# Patient Record
Sex: FEMALE | Race: WHITE | NOT HISPANIC OR LATINO | Employment: UNEMPLOYED | URBAN - METROPOLITAN AREA
[De-identification: names, ages, dates, MRNs, and addresses within clinical notes are randomized per-mention and may not be internally consistent; named-entity substitution may affect disease eponyms.]

---

## 2021-03-30 ENCOUNTER — OFFICE VISIT (OUTPATIENT)
Dept: FAMILY MEDICINE CLINIC | Facility: CLINIC | Age: 36
End: 2021-03-30
Payer: COMMERCIAL

## 2021-03-30 VITALS
DIASTOLIC BLOOD PRESSURE: 82 MMHG | BODY MASS INDEX: 22.1 KG/M2 | RESPIRATION RATE: 16 BRPM | SYSTOLIC BLOOD PRESSURE: 124 MMHG | WEIGHT: 140.8 LBS | TEMPERATURE: 98.3 F | HEIGHT: 67 IN | HEART RATE: 68 BPM | OXYGEN SATURATION: 100 %

## 2021-03-30 DIAGNOSIS — Z13.6 SCREENING FOR CARDIOVASCULAR CONDITION: ICD-10-CM

## 2021-03-30 DIAGNOSIS — Z00.00 ANNUAL PHYSICAL EXAM: Primary | ICD-10-CM

## 2021-03-30 DIAGNOSIS — R59.9 ADENOPATHY: ICD-10-CM

## 2021-03-30 DIAGNOSIS — R53.83 OTHER FATIGUE: ICD-10-CM

## 2021-03-30 DIAGNOSIS — J35.1 SWELLING OF TONSIL: ICD-10-CM

## 2021-03-30 DIAGNOSIS — Z86.32 HISTORY OF GESTATIONAL DIABETES: ICD-10-CM

## 2021-03-30 DIAGNOSIS — N13.5 UPJ OBSTRUCTION, ACQUIRED: ICD-10-CM

## 2021-03-30 PROCEDURE — 99202 OFFICE O/P NEW SF 15 MIN: CPT | Performed by: NURSE PRACTITIONER

## 2021-03-30 PROCEDURE — 3008F BODY MASS INDEX DOCD: CPT | Performed by: NURSE PRACTITIONER

## 2021-03-30 PROCEDURE — 99385 PREV VISIT NEW AGE 18-39: CPT | Performed by: NURSE PRACTITIONER

## 2021-03-30 PROCEDURE — 3725F SCREEN DEPRESSION PERFORMED: CPT | Performed by: NURSE PRACTITIONER

## 2021-03-30 PROCEDURE — 1036F TOBACCO NON-USER: CPT | Performed by: NURSE PRACTITIONER

## 2021-03-30 NOTE — PROGRESS NOTES
[facility]    NAME: Ferdinand Aldana  AGE: 38 y o  SEX: female  : 1985     DATE: 3/30/2021     Assessment and Plan:     Problem List Items Addressed This Visit     None      Visit Diagnoses     UPJ obstruction, acquired    -  Primary    Relevant Orders    Ambulatory referral to Urology    UA (URINE) with reflex to Scope    Other fatigue        Relevant Orders    CBC and differential    TSH, 3rd generation with Free T4 reflex    Ferritin    Iron    TIBC    Vitamin B12    Vitamin D 25 hydroxy    Lyme Total Antibody Profile with reflex to WB    History of gestational diabetes        Relevant Orders    Hemoglobin A1C    UA (URINE) with reflex to Scope    Screening for cardiovascular condition        Relevant Orders    Comprehensive metabolic panel    Lipid Panel with Direct LDL reflex    Annual physical exam              Immunizations and preventive care screenings were discussed with patient today  Appropriate education was printed on patient's after visit summary  Counseling:  Alcohol/drug use: discussed moderation in alcohol intake, the recommendations for healthy alcohol use, and avoidance of illicit drug use  Dental Health: discussed importance of regular tooth brushing, flossing, and dental visits  Injury prevention: discussed safety/seat belts, safety helmets, smoke detectors, carbon dioxide detectors, and smoking near bedding or upholstery  Sexual health: discussed sexually transmitted diseases, partner selection, use of condoms, avoidance of unintended pregnancy, and contraceptive alternatives  · Exercise: the importance of regular exercise/physical activity was discussed  Recommend exercise 3-5 times per week for at least 30 minutes  BMI Counseling: Body mass index is 22 05 kg/m²   The BMI is above normal  Nutrition recommendations include decreasing portion sizes, encouraging healthy choices of fruits and vegetables, decreasing fast food intake, consuming healthier snacks, limiting drinks that contain sugar, moderation in carbohydrate intake, increasing intake of lean protein, reducing intake of saturated and trans fat and reducing intake of cholesterol  Exercise recommendations include moderate physical activity 150 minutes/week and strength training exercises  Depression Screening and Follow-up Plan: Patient's depression screening was positive with a PHQ-2 score of 0  Clincally patient does not have depression  No treatment is required  No follow-ups on file  Chief Complaint:     Chief Complaint   Patient presents with   [facility]      Patient reports that she has a raised area of skin behind her left tonsil that she first noted about 3 months ago    Blood in stool     Patient reports a singe episode of bright red blood in her stool 3 weeks ago  History of Present Illness:     Adult Annual Physical   Patient here for a comprehensive physical exam  The patient reports problems - fatigue, throat problem  Diet and Physical Activity  · Diet/Nutrition: well balanced diet and consuming 3-5 servings of fruits/vegetables daily  · Exercise: walking and 5-7 times a week on average  Depression Screening  PHQ-9 Depression Screening    PHQ-9:   Frequency of the following problems over the past two weeks:      Little interest or pleasure in doing things: 0 - not at all  Feeling down, depressed, or hopeless: 0 - not at all  PHQ-2 Score: 0       General Health  · Sleep: sleeps well  · Hearing: normal - bilateral   · Vision: most recent eye exam <1 year ago, wears glasses and wears contacts  · Dental: regular dental visits, brushes teeth twice daily and flosses teeth occasionally  /GYN Health  · Last menstrual period: 3/3/2021  · Contraceptive method: celebacy  · History of STDs?: no      Review of Systems:     Review of Systems   Constitutional: Positive for fatigue  HENT: Positive for sore throat  Eyes: Negative  Respiratory: Negative  Cardiovascular: Negative  Gastrointestinal: Negative  Abdominal pain: "pressure from surgery"   Endocrine: Negative  Genitourinary: Negative  Musculoskeletal: Negative  Skin: Negative  Allergic/Immunologic: Negative  Neurological: Negative  Hematological: Negative  Psychiatric/Behavioral: Negative  Past Medical History:     No past medical history on file  Past Surgical History:     No past surgical history on file     Social History:     E-Cigarette/Vaping    E-Cigarette Use Never User      E-Cigarette/Vaping Substances    Nicotine No     THC No     CBD No     Flavoring No     Other No     Unknown No      Social History     Socioeconomic History    Marital status: /Civil Union     Spouse name: None    Number of children: None    Years of education: None    Highest education level: None   Occupational History    None   Social Needs    Financial resource strain: None    Food insecurity     Worry: None     Inability: None    Transportation needs     Medical: None     Non-medical: None   Tobacco Use    Smoking status: Never Smoker    Smokeless tobacco: Never Used    Tobacco comment: Smoked once in her teens   Substance and Sexual Activity    Alcohol use: Not Currently     Frequency: 2-4 times a month     Drinks per session: 1 or 2     Binge frequency: Never    Drug use: Never    Sexual activity: Not Currently   Lifestyle    Physical activity     Days per week: 2 days     Minutes per session: 60 min    Stress: Rather much   Relationships    Social connections     Talks on phone: More than three times a week     Gets together: Once a week     Attends Protestant service: Never     Active member of club or organization: No     Attends meetings of clubs or organizations: Never     Relationship status:     Intimate partner violence     Fear of current or ex partner: No Emotionally abused: No     Physically abused: No     Forced sexual activity: No   Other Topics Concern    None   Social History Narrative    None      Family History:     No family history on file  Current Medications:     No current outpatient medications on file  No current facility-administered medications for this visit  Allergies: Allergies   Allergen Reactions    Ciprofloxacin Headache and Rash     Other reaction(s): headaches  Other reaction(s): headaches  Other reaction(s): headaches  Other reaction(s): headaches  Other reaction(s): headaches  Other reaction(s): headaches    Other reaction(s): HEADACHE   Other reaction(s): headaches  Other reaction(s): headaches        Physical Exam:     /82 (BP Location: Left arm, Patient Position: Sitting, Cuff Size: Standard)   Pulse 68   Temp 98 3 °F (36 8 °C) (Tympanic)   Resp 16   Ht 5' 7" (1 702 m)   Wt 63 9 kg (140 lb 12 8 oz)   LMP 03/03/2021 (Approximate)   SpO2 100%   Breastfeeding No   BMI 22 05 kg/m²     Physical Exam  Vitals signs and nursing note reviewed  Constitutional:       General: She is not in acute distress  Appearance: Normal appearance  She is not ill-appearing, toxic-appearing or diaphoretic  HENT:      Head: Normocephalic and atraumatic  Right Ear: Tympanic membrane, ear canal and external ear normal  There is no impacted cerumen  Left Ear: Tympanic membrane, ear canal and external ear normal  There is no impacted cerumen  Nose: Nose normal  No congestion or rhinorrhea  Mouth/Throat:      Mouth: Mucous membranes are moist       Pharynx: Oropharynx is clear  No oropharyngeal exudate or posterior oropharyngeal erythema  Comments: Right tonsil appears in segments, she fears it could be HPV, without exudate  Eyes:      General:         Right eye: No discharge  Left eye: No discharge  Extraocular Movements: Extraocular movements intact        Conjunctiva/sclera: Conjunctivae normal       Pupils: Pupils are equal, round, and reactive to light  Neck:      Musculoskeletal: Normal range of motion and neck supple  No neck rigidity or muscular tenderness  Vascular: No carotid bruit  Cardiovascular:      Rate and Rhythm: Normal rate and regular rhythm  Pulses: Normal pulses  Heart sounds: Normal heart sounds  No murmur  Pulmonary:      Effort: Pulmonary effort is normal  No respiratory distress  Breath sounds: Normal breath sounds  Abdominal:      General: Abdomen is flat  Bowel sounds are normal       Palpations: Abdomen is soft  Tenderness: There is abdominal tenderness (by surgical site,without signsd of infection)  There is no right CVA tenderness or left CVA tenderness  Musculoskeletal: Normal range of motion  General: No swelling or tenderness  Right lower leg: No edema  Left lower leg: No edema  Lymphadenopathy:      Cervical: Cervical adenopathy present  Skin:     General: Skin is warm and dry  Capillary Refill: Capillary refill takes less than 2 seconds  Findings: No lesion or rash  Neurological:      Mental Status: She is alert and oriented to person, place, and time  Cranial Nerves: No cranial nerve deficit  Gait: Gait normal    Psychiatric:         Mood and Affect: Mood normal          Behavior: Behavior normal          Thought Content:  Thought content normal          Judgment: Judgment normal           Lodema Certain, [address]

## 2021-03-30 NOTE — PATIENT INSTRUCTIONS

## 2021-03-31 ENCOUNTER — TELEPHONE (OUTPATIENT)
Dept: UROLOGY | Facility: AMBULATORY SURGERY CENTER | Age: 36
End: 2021-03-31

## 2021-03-31 NOTE — TELEPHONE ENCOUNTER
Patient being referred to Dr Melissa Bennett for N13 5 (ICD-10-CM) - UPJ obstruction  1st attempt lm to schedule appointment but I see she already sees a different urologist in [place]  Advised if she is seeing a different urologist no need to call back unless she wants to schedule with us

## 2021-04-05 NOTE — TELEPHONE ENCOUNTER
Scheduled patient with Dr Ana Sanchez on 04/16/21  Patient would like to know how many surgery does Dr Ana Sanchez do in a year for this  Patient advised that this was not for a kidney stone it was for a blood vessel on her ureter  Patient just would like to know if Dr Ana Sanchez would be the perfect fit for her  Please advise       Complaint/diagnosis: UPJ obstruction    Insurance:[employer] O    History of Cancer:no    Previous Urologist:Yes Dr Cecilia Raman and Dr Robina Cook    Outside testing/where:no      Records requested/where:no in care everywhere    Preferred Location:[facility]

## 2021-04-06 NOTE — TELEPHONE ENCOUNTER
SULEMA Feliciano to check on website to look up Dr Tripp Herzog - He is the only doctor at our [facility] location    On the website all our other urologists profiles are listed and she can check them out but the best way is to meet him and she is scheduled for an appointment on 4/16 @ 1:45

## 2021-04-16 ENCOUNTER — CONSULT (OUTPATIENT)
Dept: UROLOGY | Facility: HOSPITAL | Age: 36
End: 2021-04-16
Payer: COMMERCIAL

## 2021-04-16 VITALS — WEIGHT: 140 LBS | BODY MASS INDEX: 21.97 KG/M2 | HEIGHT: 67 IN

## 2021-04-16 DIAGNOSIS — N13.5 UPJ OBSTRUCTION, ACQUIRED: ICD-10-CM

## 2021-04-16 LAB — POST-VOID RESIDUAL VOLUME, ML POC: 18 ML

## 2021-04-16 PROCEDURE — 99204 OFFICE O/P NEW MOD 45 MIN: CPT | Performed by: UROLOGY

## 2021-04-16 PROCEDURE — 3008F BODY MASS INDEX DOCD: CPT | Performed by: UROLOGY

## 2021-04-16 PROCEDURE — 51798 US URINE CAPACITY MEASURE: CPT | Performed by: UROLOGY

## 2021-04-16 RX ORDER — IPRATROPIUM BROMIDE 21 UG/1
SPRAY, METERED NASAL
COMMUNITY
Start: 2021-04-02

## 2021-04-16 NOTE — PROGRESS NOTES
HISTORY:    Third opinion regarding persistent abdominal pain after pyeloplasty performed in fall 2020  See extensive records from elsewhere  Right UPJ obstruction with scan showing diminished perfusion and delayed excretion  Surgery done in [place]  Preop symptoms were epigastric pain, right lower quadrant pain and tenderness, and moderate right CVA pain    Postop, her CVA pain is largely resolved, but she has persistent and she feels severe episodic epigastric pain, and worsening right lower quadrant pain and tenderness to palpation  Had a second opinion in [place], and is here for a third opinion  She also has a sensation of poor bladder emptying, double voiding  No burning urgency frequency or infections  ASSESSMENT / PLAN:    Nuclear medicine renal scan done last month showed no obstruction, good perfusion  There was seen to be Slight calyceal dilation, but that is not unusual after successful pyeloplasty    I do not know why she would have  epigastric pain and right lower  quadrant tenderness, I suspect that is not related to her prior UPJ obstruction, and not postsurgical as well    She has GI evaluation upcoming    She is quite bothered by the persistent pain, but I have told her that the nuclear medicine scan is very reassuring that her kidney has been fixed and she has no more obstruction  The following portions of the patient's history were reviewed and updated as appropriate: allergies, current medications, past family history, past medical history, past social history, past surgical history and problem list     Review of Systems   All other systems reviewed and are negative  Objective:     Physical Exam  Constitutional:       General: She is not in acute distress  Appearance: She is well-developed  She is not diaphoretic  HENT:      Head: Normocephalic and atraumatic  Eyes:      General: No scleral icterus    Pulmonary:      Effort: Pulmonary effort is normal    Abdominal:      Comments: Mild tenderness to palpation epigastric and right lower quadrant   Skin:     Coloration: Skin is not pale  Neurological:      Mental Status: She is alert and oriented to person, place, and time  Psychiatric:         Behavior: Behavior normal          Thought Content: Thought content normal          Judgment: Judgment normal            No results found for: PSA]  No results found for: BUN  No results found for: CREATININE  No components found for: CBC      There is no problem list on file for this patient  Diagnoses and all orders for this visit:    UPJ obstruction, acquired  -     Ambulatory referral to Urology  -     POCT Measure PVR    Other orders  -     ipratropium (ATROVENT) 0 03 % nasal spray           Patient ID: Benjamin Oswald is a 38 y o  female  Current Outpatient Medications:     ipratropium (ATROVENT) 0 03 % nasal spray, , Disp: , Rfl:     No past medical history on file  No past surgical history on file      Social History

## 2021-04-20 ENCOUNTER — IMMUNIZATIONS (OUTPATIENT)
Dept: FAMILY MEDICINE CLINIC | Facility: HOSPITAL | Age: 36
End: 2021-04-20

## 2021-04-20 DIAGNOSIS — Z23 ENCOUNTER FOR IMMUNIZATION: Primary | ICD-10-CM

## 2021-04-20 PROCEDURE — 91300 SARS-COV-2 / COVID-19 MRNA VACCINE (PFIZER-BIONTECH) 30 MCG: CPT

## 2021-04-20 PROCEDURE — 0001A SARS-COV-2 / COVID-19 MRNA VACCINE (PFIZER-BIONTECH) 30 MCG: CPT

## 2021-05-13 ENCOUNTER — OFFICE VISIT (OUTPATIENT)
Dept: GASTROENTEROLOGY | Facility: CLINIC | Age: 36
End: 2021-05-13
Payer: COMMERCIAL

## 2021-05-13 VITALS — DIASTOLIC BLOOD PRESSURE: 80 MMHG | BODY MASS INDEX: 22.4 KG/M2 | SYSTOLIC BLOOD PRESSURE: 140 MMHG | WEIGHT: 143 LBS

## 2021-05-13 DIAGNOSIS — Z12.11 SCREENING FOR COLON CANCER: ICD-10-CM

## 2021-05-13 DIAGNOSIS — R10.13 EPIGASTRIC PAIN: Primary | ICD-10-CM

## 2021-05-13 DIAGNOSIS — R10.31 RIGHT LOWER QUADRANT ABDOMINAL PAIN: ICD-10-CM

## 2021-05-13 PROCEDURE — 99203 OFFICE O/P NEW LOW 30 MIN: CPT | Performed by: INTERNAL MEDICINE

## 2021-05-13 PROCEDURE — 1036F TOBACCO NON-USER: CPT | Performed by: INTERNAL MEDICINE

## 2021-05-13 NOTE — PROGRESS NOTES
[facility] - Outpatient Consultation  Tatum Morocho 38 y o  female MRN: [id number]  Encounter: [id number]    ASSESSMENT AND PLAN:      1  Epigastric pain  Epigastric pain it is really most suggestive of abdominal wall a muscular pain  It increases with tensing of the abdominal muscles and performing a Valsalva maneuver  There is no relation to p o  intake and the patient is in fact eating fine without any symptoms  Advise gentle stretching and also consideration of consultation with physical therapy or Sports Medicine  · Diet as tolerated  · Observe for any GI symptoms related to her upper abdominal wall pain  · Consider consultation with physical therapy or Sports Medicine    2  Right lower quadrant abdominal pain  Again the right lower quadrant pain too is not related to GI function  There is no exacerbation with eating or not eating  There is no relationship to having stools or not  There is no relief with defecation  She is having normal bowel movements regardless the presence or absence of pain  This 2 might be muscular related to the abdominal wall strain  It is also in the same side that she has been diagnosed with hydronephrosis and has had a pyloroplasty  There may be some underlying adhesions or referred pain if there is any pressure in the urinary tract  Again in the absence of GI symptoms I do not think endoscopic evaluation is indicated at this time but I have advised the patient to pay attention to any associated GI symptoms  3  Screening for colon cancer  Average risk  Recommend screening colonoscopy at age 36-53  Followup Appointment:   About 3 months or sooner if needed  ______________________________________________________________________    Chief Complaint   Patient presents with    Abdominal Pain    Bloated    Rectal Bleeding       HPI:   Tatum Morocho is a 38y o  year old female who presents with abdominal pain  Abdominal pain for a year  Epigastric pain worse with lifting and increased activity  Tightness  Also with RLQ tenderness  Last year found to have bilateral hydronephrosis, wound up getting pyeloplasty  No food triggers, appetite and weight normal   No postprandial discomfort  No relationship to stool  Stools normal, daily formed stools, no diarrhea or straining  Last March had small streak of blood on stool once  Reports that scans look like things are working as expected, but still with some hydro on the right  No medications or OTC Rx/supplements       Historical Information   Past Medical History:   Diagnosis Date    Hydronephrosis      Past Surgical History:   Procedure Laterality Date    COLONOSCOPY      October 2013:  Anal fissure and hemorrhoids, otherwise normal colonoscopy including biopsy negative for microscopic colitis    PYELOPLASTY      robotic     Social History     Substance and Sexual Activity   Alcohol Use Not Currently    Frequency: 2-4 times a month    Drinks per session: 1 or 2    Binge frequency: Never     Social History     Substance and Sexual Activity   Drug Use Never     Social History     Tobacco Use   Smoking Status Never Smoker   Smokeless Tobacco Never Used   Tobacco Comment    Smoked once in her teens     Family History   Problem Relation Age of Onset    Cancer Father     Kidney cancer Father     Colon polyps Neg Hx     Colon cancer Neg Hx     Inflammatory bowel disease Neg Hx     Celiac disease Neg Hx        Meds/Allergies     Current Outpatient Medications:     ipratropium (ATROVENT) 0 03 % nasal spray    Allergies   Allergen Reactions    Ciprofloxacin Headache and Rash     Other reaction(s): headaches  Other reaction(s): headaches  Other reaction(s): headaches  Other reaction(s): headaches  Other reaction(s): headaches  Other reaction(s): headaches    Other reaction(s): HEADACHE   Other reaction(s): headaches  Other reaction(s): headaches         PHYSICAL EXAM:    Blood pressure 140/80, weight 64 9 kg (143 lb), not currently breastfeeding  Body mass index is 22 4 kg/m²  General Appearance: NAD, cooperative, alert  Eyes: Anicteric, PERRLA, EOMI  ENT:  Normocephalic, atraumatic, normal mucosa  Neck:  Supple, symmetrical, trachea midline,   Resp:  Clear to auscultation bilaterally; no rales, rhonchi or wheezing; respirations unlabored   CV:  S1 S2, Regular rate and rhythm; no murmur, rub, or gallop  GI:  Soft,   Mild epigastric tenderness when tensing abdominal musculature is with Valsalva maneuver, non-distended; normal bowel sounds; no masses, no organomegaly   Rectal: Deferred  Musculoskeletal: No cyanosis, clubbing or edema  Normal ROM  Skin:  No jaundice, rashes, or lesions   Heme/Lymph: No palpable cervical lymphadenopathy  Psych: Normal affect, good eye contact  Neuro: No gross deficits, AAOx3    Lab Results:   No results found for: WBC, HGB, HCT, MCV, PLT  No results found for: NA, K, CL, CO2, ANIONGAP, BUN, CREATININE, GLUCOSE, GLUF, CALCIUM, CORRECTEDCA, AST, ALT, ALKPHOS, PROT, BILITOT, EGFR  No results found for: IRON, TIBC, FERRITIN  No results found for: LIPASE    Radiology Results:   No results found  REVIEW OF SYSTEMS:    CONSTITUTIONAL: Denies any fever, chills, rigors, and weight loss  HEENT: No earache or tinnitus  Denies hearing loss or visual disturbances  CARDIOVASCULAR: No chest pain or palpitations  RESPIRATORY: Denies any cough, hemoptysis, shortness of breath or dyspnea on exertion  GASTROINTESTINAL: As noted in the History of Present Illness  GENITOURINARY: No problems with urination  Denies any hematuria or dysuria  NEUROLOGIC: No dizziness or vertigo, denies headaches  MUSCULOSKELETAL: Denies any muscle or joint pain  SKIN: Denies skin rashes or itching  ENDOCRINE: Denies excessive thirst  Denies intolerance to heat or cold  PSYCHOSOCIAL: Denies depression or anxiety  Denies any recent memory loss

## 2021-05-13 NOTE — PATIENT INSTRUCTIONS
Diet as tolerated, encourage plenty of dietary fiber and fluids  Observe for any relationship of GI function, I e  eating or bowel movements, with upper abdominal pain or right lower quadrant pain  Consider consultation with sports medicine her physical therapy to assess abdominal wall pathology

## 2021-05-13 NOTE — LETTER
May 13, 2021     [address] [employer]    Patient: Fe Gonzalez   YOB: 1985   Date of Visit: 5/13/2021       Dear Dr Sosa Mt:    Thank you for referring Fe Gonzalez to me for evaluation  Below are my notes for this consultation  If you have questions, please do not hesitate to call me  I look forward to following your patient along with you  Sincerely,        Heena Villalobos MD        CC: No Recipients  Heena Villalobos MD  5/13/2021  5:54 PM  Sign when Signing Visit    [address] [facility] - Outpatient Consultation  Fe Gonzalez 38 y o  female MRN: [id number]  Encounter: [id number]    ASSESSMENT AND PLAN:      1  Epigastric pain  Epigastric pain it is really most suggestive of abdominal wall a muscular pain  It increases with tensing of the abdominal muscles and performing a Valsalva maneuver  There is no relation to p o  intake and the patient is in fact eating fine without any symptoms  Advise gentle stretching and also consideration of consultation with physical therapy or Sports Medicine  · Diet as tolerated  · Observe for any GI symptoms related to her upper abdominal wall pain  · Consider consultation with physical therapy or Sports Medicine    2  Right lower quadrant abdominal pain  Again the right lower quadrant pain too is not related to GI function  There is no exacerbation with eating or not eating  There is no relationship to having stools or not  There is no relief with defecation  She is having normal bowel movements regardless the presence or absence of pain  This 2 might be muscular related to the abdominal wall strain  It is also in the same side that she has been diagnosed with hydronephrosis and has had a pyloroplasty  There may be some underlying adhesions or referred pain if there is any pressure in the urinary tract    Again in the absence of GI symptoms I do not think endoscopic evaluation is indicated at this time but I have advised the patient to pay attention to any associated GI symptoms  3  Screening for colon cancer  Average risk  Recommend screening colonoscopy at age 36-53  Followup Appointment:   About 3 months or sooner if needed  ______________________________________________________________________    Chief Complaint   Patient presents with    Abdominal Pain    Bloated    Rectal Bleeding       HPI:   Agustina Moe is a 38y o  year old female who presents with abdominal pain  Abdominal pain for a year  Epigastric pain worse with lifting and increased activity  Tightness  Also with RLQ tenderness  Last year found to have bilateral hydronephrosis, wound up getting pyeloplasty  No food triggers, appetite and weight normal   No postprandial discomfort  No relationship to stool  Stools normal, daily formed stools, no diarrhea or straining  Last March had small streak of blood on stool once  Reports that scans look like things are working as expected, but still with some hydro on the right  No medications or OTC Rx/supplements       Historical Information   Past Medical History:   Diagnosis Date    Hydronephrosis      Past Surgical History:   Procedure Laterality Date    COLONOSCOPY      October 2013:  Anal fissure and hemorrhoids, otherwise normal colonoscopy including biopsy negative for microscopic colitis    PYELOPLASTY      robotic     Social History     Substance and Sexual Activity   Alcohol Use Not Currently    Frequency: 2-4 times a month    Drinks per session: 1 or 2    Binge frequency: Never     Social History     Substance and Sexual Activity   Drug Use Never     Social History     Tobacco Use   Smoking Status Never Smoker   Smokeless Tobacco Never Used   Tobacco Comment    Smoked once in her teens     Family History   Problem Relation Age of Onset    Cancer Father     Kidney cancer Father     Colon polyps Neg Hx     Colon cancer Neg Hx     Inflammatory bowel disease Neg Hx     Celiac disease Neg Hx        Meds/Allergies     Current Outpatient Medications:     ipratropium (ATROVENT) 0 03 % nasal spray    Allergies   Allergen Reactions    Ciprofloxacin Headache and Rash     Other reaction(s): headaches  Other reaction(s): headaches  Other reaction(s): headaches  Other reaction(s): headaches  Other reaction(s): headaches  Other reaction(s): headaches    Other reaction(s): HEADACHE   Other reaction(s): headaches  Other reaction(s): headaches         PHYSICAL EXAM:    Blood pressure 140/80, weight 64 9 kg (143 lb), not currently breastfeeding  Body mass index is 22 4 kg/m²  General Appearance: NAD, cooperative, alert  Eyes: Anicteric, PERRLA, EOMI  ENT:  Normocephalic, atraumatic, normal mucosa  Neck:  Supple, symmetrical, trachea midline,   Resp:  Clear to auscultation bilaterally; no rales, rhonchi or wheezing; respirations unlabored   CV:  S1 S2, Regular rate and rhythm; no murmur, rub, or gallop  GI:  Soft,   Mild epigastric tenderness when tensing abdominal musculature is with Valsalva maneuver, non-distended; normal bowel sounds; no masses, no organomegaly   Rectal: Deferred  Musculoskeletal: No cyanosis, clubbing or edema  Normal ROM  Skin:  No jaundice, rashes, or lesions   Heme/Lymph: No palpable cervical lymphadenopathy  Psych: Normal affect, good eye contact  Neuro: No gross deficits, AAOx3    Lab Results:   No results found for: WBC, HGB, HCT, MCV, PLT  No results found for: NA, K, CL, CO2, ANIONGAP, BUN, CREATININE, GLUCOSE, GLUF, CALCIUM, CORRECTEDCA, AST, ALT, ALKPHOS, PROT, BILITOT, EGFR  No results found for: IRON, TIBC, FERRITIN  No results found for: LIPASE    Radiology Results:   No results found  REVIEW OF SYSTEMS:    CONSTITUTIONAL: Denies any fever, chills, rigors, and weight loss  HEENT: No earache or tinnitus  Denies hearing loss or visual disturbances    CARDIOVASCULAR: No chest pain or palpitations  RESPIRATORY: Denies any cough, hemoptysis, shortness of breath or dyspnea on exertion  GASTROINTESTINAL: As noted in the History of Present Illness  GENITOURINARY: No problems with urination  Denies any hematuria or dysuria  NEUROLOGIC: No dizziness or vertigo, denies headaches  MUSCULOSKELETAL: Denies any muscle or joint pain  SKIN: Denies skin rashes or itching  ENDOCRINE: Denies excessive thirst  Denies intolerance to heat or cold  PSYCHOSOCIAL: Denies depression or anxiety  Denies any recent memory loss

## 2021-05-15 ENCOUNTER — IMMUNIZATIONS (OUTPATIENT)
Dept: FAMILY MEDICINE CLINIC | Facility: HOSPITAL | Age: 36
End: 2021-05-15

## 2021-05-15 DIAGNOSIS — Z23 ENCOUNTER FOR IMMUNIZATION: Primary | ICD-10-CM

## 2021-05-15 PROCEDURE — 0002A SARS-COV-2 / COVID-19 MRNA VACCINE (PFIZER-BIONTECH) 30 MCG: CPT

## 2021-05-15 PROCEDURE — 91300 SARS-COV-2 / COVID-19 MRNA VACCINE (PFIZER-BIONTECH) 30 MCG: CPT

## 2024-01-18 ENCOUNTER — APPOINTMENT (OUTPATIENT)
Dept: LAB | Facility: CLINIC | Age: 39
End: 2024-01-18

## 2024-01-18 DIAGNOSIS — R00.2 PALPITATIONS: ICD-10-CM

## 2024-01-18 DIAGNOSIS — F41.0 PANIC DISORDER WITHOUT AGORAPHOBIA: ICD-10-CM

## 2024-09-20 ENCOUNTER — APPOINTMENT (OUTPATIENT)
Dept: LAB | Facility: CLINIC | Age: 39
End: 2024-09-20
Payer: COMMERCIAL

## 2024-09-20 DIAGNOSIS — R42 DIZZINESS AND GIDDINESS: ICD-10-CM

## 2024-09-20 DIAGNOSIS — R00.2 PALPITATIONS: ICD-10-CM

## 2024-09-20 LAB
ALBUMIN SERPL BCG-MCNC: 4.2 G/DL (ref 3.5–5)
ALP SERPL-CCNC: 65 U/L (ref 34–104)
ALT SERPL W P-5'-P-CCNC: 23 U/L (ref 7–52)
ANION GAP SERPL CALCULATED.3IONS-SCNC: 5 MMOL/L (ref 4–13)
AST SERPL W P-5'-P-CCNC: 22 U/L (ref 13–39)
BASOPHILS # BLD AUTO: 0.05 THOUSANDS/ΜL (ref 0–0.1)
BASOPHILS NFR BLD AUTO: 1 % (ref 0–1)
BILIRUB SERPL-MCNC: 0.59 MG/DL (ref 0.2–1)
BUN SERPL-MCNC: 14 MG/DL (ref 5–25)
CALCIUM SERPL-MCNC: 9 MG/DL (ref 8.4–10.2)
CHLORIDE SERPL-SCNC: 103 MMOL/L (ref 96–108)
CO2 SERPL-SCNC: 28 MMOL/L (ref 21–32)
CREAT SERPL-MCNC: 0.74 MG/DL (ref 0.6–1.3)
EOSINOPHIL # BLD AUTO: 0.17 THOUSAND/ΜL (ref 0–0.61)
EOSINOPHIL NFR BLD AUTO: 2 % (ref 0–6)
ERYTHROCYTE [DISTWIDTH] IN BLOOD BY AUTOMATED COUNT: 14.1 % (ref 11.6–15.1)
EST. AVERAGE GLUCOSE BLD GHB EST-MCNC: 134 MG/DL
FERRITIN SERPL-MCNC: 4 NG/ML (ref 11–307)
GFR SERPL CREATININE-BSD FRML MDRD: 102 ML/MIN/1.73SQ M
GLUCOSE SERPL-MCNC: 176 MG/DL (ref 65–140)
HBA1C MFR BLD: 6.3 %
HCT VFR BLD AUTO: 36.5 % (ref 34.8–46.1)
HGB BLD-MCNC: 11.7 G/DL (ref 11.5–15.4)
IMM GRANULOCYTES # BLD AUTO: 0.02 THOUSAND/UL (ref 0–0.2)
IMM GRANULOCYTES NFR BLD AUTO: 0 % (ref 0–2)
IRON SATN MFR SERPL: 18 % (ref 15–50)
IRON SERPL-MCNC: 72 UG/DL (ref 50–212)
LYMPHOCYTES # BLD AUTO: 1.65 THOUSANDS/ΜL (ref 0.6–4.47)
LYMPHOCYTES NFR BLD AUTO: 19 % (ref 14–44)
MCH RBC QN AUTO: 27.1 PG (ref 26.8–34.3)
MCHC RBC AUTO-ENTMCNC: 32.1 G/DL (ref 31.4–37.4)
MCV RBC AUTO: 85 FL (ref 82–98)
MONOCYTES # BLD AUTO: 0.58 THOUSAND/ΜL (ref 0.17–1.22)
MONOCYTES NFR BLD AUTO: 7 % (ref 4–12)
NEUTROPHILS # BLD AUTO: 6.47 THOUSANDS/ΜL (ref 1.85–7.62)
NEUTS SEG NFR BLD AUTO: 71 % (ref 43–75)
NRBC BLD AUTO-RTO: 0 /100 WBCS
PLATELET # BLD AUTO: 168 THOUSANDS/UL (ref 149–390)
POTASSIUM SERPL-SCNC: 4.1 MMOL/L (ref 3.5–5.3)
PROT SERPL-MCNC: 6.4 G/DL (ref 6.4–8.4)
RBC # BLD AUTO: 4.31 MILLION/UL (ref 3.81–5.12)
SODIUM SERPL-SCNC: 136 MMOL/L (ref 135–147)
TIBC SERPL-MCNC: 408 UG/DL (ref 250–450)
UIBC SERPL-MCNC: 336 UG/DL (ref 155–355)
WBC # BLD AUTO: 8.94 THOUSAND/UL (ref 4.31–10.16)

## 2024-09-20 PROCEDURE — 83550 IRON BINDING TEST: CPT

## 2024-09-20 PROCEDURE — 83540 ASSAY OF IRON: CPT

## 2024-09-20 PROCEDURE — 82728 ASSAY OF FERRITIN: CPT

## 2024-09-20 PROCEDURE — 36415 COLL VENOUS BLD VENIPUNCTURE: CPT

## 2024-09-20 PROCEDURE — 80053 COMPREHEN METABOLIC PANEL: CPT

## 2024-09-20 PROCEDURE — 85025 COMPLETE CBC W/AUTO DIFF WBC: CPT

## 2024-09-20 PROCEDURE — 83036 HEMOGLOBIN GLYCOSYLATED A1C: CPT

## 2024-09-27 LAB
ATRIAL RATE: 63 BPM
P AXIS: 52 DEGREES
PR INTERVAL: 162 MS
QRS AXIS: 84 DEGREES
QRSD INTERVAL: 100 MS
QT INTERVAL: 434 MS
QTC INTERVAL: 444 MS
T WAVE AXIS: 41 DEGREES
VENTRICULAR RATE: 63 BPM

## 2024-09-27 PROCEDURE — 93010 ELECTROCARDIOGRAM REPORT: CPT | Performed by: INTERNAL MEDICINE

## 2024-11-11 ENCOUNTER — OFFICE VISIT (OUTPATIENT)
Dept: ENDOCRINOLOGY | Facility: CLINIC | Age: 39
End: 2024-11-11
Payer: COMMERCIAL

## 2024-11-11 VITALS
DIASTOLIC BLOOD PRESSURE: 84 MMHG | HEIGHT: 67 IN | WEIGHT: 137.8 LBS | SYSTOLIC BLOOD PRESSURE: 128 MMHG | BODY MASS INDEX: 21.63 KG/M2

## 2024-11-11 DIAGNOSIS — R73.03 PREDIABETES: Primary | ICD-10-CM

## 2024-11-11 DIAGNOSIS — E55.9 VITAMIN D DEFICIENCY: ICD-10-CM

## 2024-11-11 DIAGNOSIS — E61.1 IRON DEFICIENCY: ICD-10-CM

## 2024-11-11 PROCEDURE — 99204 OFFICE O/P NEW MOD 45 MIN: CPT

## 2024-11-11 RX ORDER — ERGOCALCIFEROL 1.25 MG/1
CAPSULE, LIQUID FILLED ORAL
COMMUNITY
Start: 2024-06-10

## 2024-11-11 NOTE — PROGRESS NOTES
Name: Jodie Nicole   : 1985 MRN: [id number]  Encounter: [id number]      Assessment and Plan:  Assessment & Plan  Prediabetes  Lab Results   Component Value Date    HGBA1C 6.3 (H) 2024   Previously noted to have A1C of 5.9% in 2024.   Denies symptoms of hyperglycemia.   She has a history of gestational diabetes in 5480-5645 managed with diet.   Given iron deficiency noted on labs, with possible increased RBC turnover, question accuracy of HbA1c.  Will order fructosamine which is not dependent on iron/ferritin/hemoglobin levels.  Additionally, will order C-peptide and fasting glucose, GIULIANO 65, insulin autoantibody, IA 2 and zinc transporter 8 antibody for additional evaluation.    Plan:   Will order fructosamine level, fasting C-peptide and glucose.  Will check GAD65, Insulin autoantibody, IA-2 and Zn transporter 8.   Further recommendations pending results of above.  Rest of plan as below.  Orders:    Fructosamine; Future    GAD65, IA-2, and Insulin Autoantibody; Future    Zinc Transporter 8 (Znt8) Antibody; Future    C-peptide; Future    Glucose, fasting; Future    Fructosamine    Zinc Transporter 8 (Znt8) Antibody    C-peptide    Glucose, fasting    Vitamin D deficiency  Reports history of vitamin D deficiency   Was previoulsy taking 60953 units weekly in the spring, stopped taking over the summer and was noted to have 25-OH-Vit D of 14 per patient.   Currently takes ergocalciferol 58069 units/week prescribed by PCP  No 25-OH-vit D on file.  Given low vit D and iron noted on labs, question malabsorption  Will check a celiac panel  Will get updates labs.  Orders:    Vitamin D 25 hydroxy; Future    Vitamin D 25 hydroxy    Iron deficiency  Noted to have low-normal iron levels, high-normal TIBC/UIBC.  Has Ferritin of 4  Most recent Hb in 2024 - 11.7g/dL  Question possible malabsorption contributing to low iron and vitamin D levels.  Advised patient to start taking an over the  counter iron supplement  Will order a celiac panel  Advised patient to follow up with PCP regarding low ferritin and low iron.   Orders:    Celiac Disease Panel; Future           Subjective:     Patient ID: Jodie Nicole is a 38 y.o. female with a past medical history of gestational diabetes mellitus, congenital hydronephrosis who is seen today in consult for the management of hyperglycemia.  She reports being diagnosed with gestational diabetes in 4743-8910 when pregnant with her daughter.  Reports resolution after birth, however denies having glucose tolerance testing ordered after delivery.   States that her A1c has been normal until March 2024 when it was noted to be 5.9%.  Most recent A1c on file in September 20 24-6.3%, which is why PCP recommended evaluation by endocrinology.  At this time she does not take anti-diabetes medications.  Does not own a glucometer and does not check her sugars at home.  Breakfast usually consist of coffee with cream, cup of cottage cheese with banana and pecan/almonds on Gurvinder toast.  For lunch she usually has egg whites with butter and cheese on Gurvinder toast, sometimes avocado.  Dinner usually is very consists of vegetables, protein, tacos, cheeseburgers, soup with barley, cheese.  She snacks at 3:30 PM-usually has yogurt or skyr with pecans or almonds, sometimes has a protein smoothie, guacamole and chips.  She walks 2 miles 3 times a week, does light yoga as well as body weight lunges and squats at home.  Endorses 10 pound weight gain in the past year which she attributes to feeling less anxious after being started on Zoloft.  She does take vitamin D 50,000 units weekly prescribed by her primary care provider as vitamin D levels in September were noted to be 14.  She reports that she was taking vitamin D 50,000 units weekly in the spring, however stopped it over the summer.  Endorses occasional dizziness that she describes as the room shifting, also occasional  lightheadedness, particularly if not eating or bending down to pick something up.  States that she used to pass out a lot as a child.  Denies increased thirst or urination, heat or cold intolerance, diarrhea or constipation, palpitations, tremors.        Patient has no other complaints at this time.    Review of Systems   Constitutional:  Negative for activity change, appetite change, fatigue, fever and unexpected weight change.   HENT:  Negative for congestion, trouble swallowing and voice change.    Eyes:  Negative for visual disturbance.   Respiratory:  Negative for cough and shortness of breath.    Cardiovascular:  Negative for chest pain, palpitations and leg swelling.   Gastrointestinal:  Negative for abdominal pain, constipation, diarrhea, nausea and vomiting.   Endocrine: Negative for polydipsia and polyuria.   Genitourinary:  Negative for frequency.   Musculoskeletal:  Negative for myalgias.   Skin:  Negative for rash.   Neurological:  Negative for dizziness, tremors, facial asymmetry, weakness, light-headedness, numbness and headaches.   Psychiatric/Behavioral:  Negative for dysphoric mood and sleep disturbance. The patient is not nervous/anxious.    All other systems reviewed and are negative.       There is no problem list on file for this patient.       Current Outpatient Medications   Medication Sig Dispense Refill    ergocalciferol (VITAMIN D2) 50,000 units TAKE 1 CAPSULE BY MOUTH ONE TIME PER WEEK      sertraline (ZOLOFT) 50 mg tablet 75 mg      ipratropium (ATROVENT) 0.03 % nasal spray       Multiple Vitamin (multivitamin) tablet Take 1 tablet by mouth daily (Patient not taking: Reported on 11/11/2024)       No current facility-administered medications for this visit.        Allergies   Allergen Reactions    Ciprofloxacin Headache and Rash     Other reaction(s): headaches  Other reaction(s): headaches  Other reaction(s): headaches  Other reaction(s): headaches  Other reaction(s): headaches  Other  "reaction(s): headaches    Other reaction(s): HEADACHE   Other reaction(s): headaches  Other reaction(s): headaches          The following portions of the patient's history were reviewed and updated as appropriate: allergies, current medications, past family history, past medical history, past social history, past surgical history and problem list.          Objective:    /84 (BP Location: Left arm, Patient Position: Sitting, Cuff Size: Adult)   Ht 5' 7\" (1.702 m)   Wt 62.5 kg (137 lb 12.8 oz)   BMI 21.58 kg/m²      Body mass index is 21.58 kg/m².     Physical Exam  Vitals and nursing note reviewed.   Constitutional:       General: She is not in acute distress.     Appearance: Normal appearance. She is not ill-appearing, toxic-appearing or diaphoretic.   HENT:      Head: Normocephalic and atraumatic.      Nose: Nose normal.      Mouth/Throat:      Mouth: Mucous membranes are moist.      Pharynx: Oropharynx is clear.   Eyes:      General: No scleral icterus.        Right eye: No discharge.         Left eye: No discharge.      Extraocular Movements: Extraocular movements intact.      Conjunctiva/sclera: Conjunctivae normal.   Neck:      Comments: Thyroid gland not enlarged, no palpable nodules.  Cardiovascular:      Rate and Rhythm: Normal rate and regular rhythm.      Pulses: Normal pulses.      Heart sounds: Normal heart sounds. No murmur heard.  Pulmonary:      Effort: Pulmonary effort is normal. No respiratory distress.      Breath sounds: Normal breath sounds. No wheezing, rhonchi or rales.   Abdominal:      General: Abdomen is flat. There is no distension.      Palpations: Abdomen is soft.   Musculoskeletal:         General: Normal range of motion.      Cervical back: Normal range of motion and neck supple.      Right lower leg: No edema.      Left lower leg: No edema.   Skin:     General: Skin is warm and dry.      Coloration: Skin is not jaundiced or pale.   Neurological:      Mental Status: She is " alert and oriented to person, place, and time. Mental status is at baseline.   Psychiatric:         Mood and Affect: Mood normal.         Behavior: Behavior normal.         Thought Content: Thought content normal.         Judgment: Judgment normal.       Nutrition Assessment and Intervention:     Reviewed food recall journal    Recommended completion of food recall journal      Physical Activity Assessment and Intervention:    Activity journal reviewed

## 2024-11-11 NOTE — PATIENT INSTRUCTIONS
Please get fasting bloodwork done at your earliest convenience.   Start taking over the counter iron supplement (ferrous sulfate) 325mg daily and follow up with your primary care provider for iron deficiency.   We will reach out once we have your results.   Next scheduled follow up pending lab results.

## 2024-11-12 ENCOUNTER — APPOINTMENT (OUTPATIENT)
Dept: LAB | Facility: CLINIC | Age: 39
End: 2024-11-12
Payer: COMMERCIAL

## 2024-11-12 DIAGNOSIS — E61.1 IRON DEFICIENCY: ICD-10-CM

## 2024-11-12 DIAGNOSIS — E55.9 VITAMIN D DEFICIENCY: ICD-10-CM

## 2024-11-12 DIAGNOSIS — R73.03 PREDIABETES: ICD-10-CM

## 2024-11-12 LAB
25(OH)D3 SERPL-MCNC: 31.4 NG/ML (ref 30–100)
GLIADIN PEPTIDE IGA SER-ACNC: 0.4 U/ML
GLIADIN PEPTIDE IGA SER-ACNC: NEGATIVE
GLIADIN PEPTIDE IGG SER-ACNC: <0.4 U/ML
GLIADIN PEPTIDE IGG SER-ACNC: NEGATIVE
GLUCOSE P FAST SERPL-MCNC: 114 MG/DL (ref 65–99)
IGA SERPL-MCNC: 114 MG/DL (ref 66–433)
TTG IGA SER-ACNC: <0.5 U/ML
TTG IGA SER-ACNC: NEGATIVE
TTG IGG SER-ACNC: <0.8 U/ML
TTG IGG SER-ACNC: NEGATIVE

## 2024-11-12 PROCEDURE — 36415 COLL VENOUS BLD VENIPUNCTURE: CPT

## 2024-11-12 PROCEDURE — 86364 TISS TRNSGLTMNASE EA IG CLAS: CPT

## 2024-11-12 PROCEDURE — 82306 VITAMIN D 25 HYDROXY: CPT

## 2024-11-12 PROCEDURE — 82947 ASSAY GLUCOSE BLOOD QUANT: CPT

## 2024-11-12 PROCEDURE — 86341 ISLET CELL ANTIBODY: CPT

## 2024-11-12 PROCEDURE — 86337 INSULIN ANTIBODIES: CPT

## 2024-11-12 PROCEDURE — 86258 DGP ANTIBODY EACH IG CLASS: CPT

## 2024-11-12 PROCEDURE — 82985 ASSAY OF GLYCATED PROTEIN: CPT

## 2024-11-12 PROCEDURE — 84681 ASSAY OF C-PEPTIDE: CPT

## 2024-11-12 PROCEDURE — 83519 RIA NONANTIBODY: CPT

## 2024-11-12 PROCEDURE — 82784 ASSAY IGA/IGD/IGG/IGM EACH: CPT

## 2024-11-13 LAB — FRUCTOSAMINE SERPL-SCNC: 286 UMOL/L (ref 0–285)

## 2024-11-14 LAB — C PEPTIDE SERPL-MCNC: 1.5 NG/ML (ref 1.1–4.4)

## 2024-11-15 LAB — GAD65 AB SER-ACNC: 375.3 U/ML (ref 0–5)

## 2024-11-18 ENCOUNTER — TELEPHONE (OUTPATIENT)
Age: 39
End: 2024-11-18

## 2024-11-18 ENCOUNTER — OFFICE VISIT (OUTPATIENT)
Age: 39
End: 2024-11-18
Payer: COMMERCIAL

## 2024-11-18 DIAGNOSIS — Z12.4 SCREENING FOR CERVICAL CANCER: ICD-10-CM

## 2024-11-18 DIAGNOSIS — Z11.51 SCREENING FOR HUMAN PAPILLOMAVIRUS (HPV): ICD-10-CM

## 2024-11-18 DIAGNOSIS — Z01.419 ENCOUNTER FOR ANNUAL ROUTINE GYNECOLOGICAL EXAMINATION: Primary | ICD-10-CM

## 2024-11-18 DIAGNOSIS — Z12.31 SCREENING MAMMOGRAM FOR BREAST CANCER: ICD-10-CM

## 2024-11-18 PROCEDURE — G0145 SCR C/V CYTO,THINLAYER,RESCR: HCPCS | Performed by: OBSTETRICS & GYNECOLOGY

## 2024-11-18 PROCEDURE — 99386 PREV VISIT NEW AGE 40-64: CPT | Performed by: OBSTETRICS & GYNECOLOGY

## 2024-11-18 PROCEDURE — G0476 HPV COMBO ASSAY CA SCREEN: HCPCS | Performed by: OBSTETRICS & GYNECOLOGY

## 2024-11-18 NOTE — PATIENT INSTRUCTIONS
Pap today with STD testing.   For first mammogram!  For colon CA screening at age 45.   Discussed Gardasil vaccine series for pt and  (approved until age 45).  Get your Endocrine results and see if ok to start trying for pregnancy.  Start taking prenatal vitamins.   Call with + home pregnancy test.   Patient verbalized understanding of today's discussion with all questions and concerns addressed to her satisfaction.

## 2024-11-18 NOTE — PROGRESS NOTES
What we talked about today:    Patient Instructions   Pap today with STD testing.   For first mammogram!  For colon CA screening at age 44.   Discussed Gardasil vaccine series for pt and  (approved until age 45).  Get your Endocrine results and see if ok to start trying for pregnancy.  Start taking prenatal vitamins.   Call with + home pregnancy test.   Patient verbalized understanding of today's discussion with all questions and concerns addressed to her satisfaction.            Assessment/Plan:    1. Encounter for annual routine gynecological examination  2. Screening mammogram for breast cancer  -     Mammo screening bilateral w 3d and cad; Future; Expected date: 12/15/2024  3. Screening for cervical cancer  -     Liquid-based pap, screening  -     HPV High Risk  4. Screening for human papillomavirus (HPV)  -     Liquid-based pap, screening  -     HPV High Risk          Subjective:      Patient ID: Jodie Nicole is a  39 y.o.  female, who presents for an annual exam today.     HPI:    Pt states here for annual exam.  Interested in another pregnancy.  Had gestational diabetes and saw Endo and will be seeing soon for an official diagnosis.      Not actively trying to get pregnant.     Has anal fissure but no issues w/ .     Has stent placed  for EPJ obstruction.      2013 had left breast cysts removed.  Pat gma w/ h/o breast CA.        GYN History:    No LMP recorded.     OB History          1    Para   1    Term   1       0    AB   0    Living   1         SAB   0    IAB   0    Ectopic   0    Multiple   0    Live Births   1                  Last pap smear: , wnl..  History of abnormal paps: Yes, describe: 2013 had colpo and normal since.      Smoking/alcohol/drug use. No    Exercise:  Yes, describe: chasing after 3yo.     Sun exposure: not much, Yes  sunscreen use.    Seat belt use:  Yes , appropriate counseling reviewed.      Last saw Family Physician:  <6mos ago for  physical     Up to date with vaccines:  Yes , including covid booster and flu shot 10/2024.  Rh neg, had RhoGAM.  Never had Gardasil series.    Last mammogram: never    Last colonoscopy: 2015, for GI bleed    The following portions of the patient's history were reviewed and updated as appropriate: allergies, current medications, past family history, past medical history, past social history, past surgical history, and problem list.      Family history:      Family history   is not  positive for breast, uterine, ovarian cancer, colon cancer, or melanoma skin cancer.    Other family history of cancers:  Yes, describe: pat gma with breast CA.          Review of Systems   Constitutional: Negative.    HENT: Negative.     Eyes: Negative.    Respiratory: Negative.     Cardiovascular: Negative.    Gastrointestinal: Negative.    Endocrine: Negative.    Genitourinary: Negative.    Musculoskeletal: Negative.    Skin: Negative.    Allergic/Immunologic: Negative.    Neurological: Negative.    Hematological: Negative.    Psychiatric/Behavioral: Negative.     All other systems reviewed and are negative.            Objective:      There were no vitals taken for this visit.       Physical Exam  Constitutional:       General: She is not in acute distress.     Appearance: Normal appearance. She is not ill-appearing, toxic-appearing or diaphoretic.   HENT:      Head: Normocephalic and atraumatic.     Eyes:      Extraocular Movements: Extraocular movements intact.       Cardiovascular:      Rate and Rhythm: Normal rate and regular rhythm.   Pulmonary:      Effort: Pulmonary effort is normal.      Breath sounds: Normal breath sounds.   Abdominal:      General: Bowel sounds are normal.      Palpations: Abdomen is soft.     Musculoskeletal:      Cervical back: Neck supple.     Skin:     General: Skin is warm.     Neurological:      Mental Status: She is alert and oriented to person, place, and time.     Psychiatric:         Mood and  Affect: Mood normal.         Behavior: Behavior normal.         Thought Content: Thought content normal.         Judgment: Judgment normal.           Cardiac:  murmur appreciated No    Breast exam: normal    GYN exam: NEFG.  No bladder, CMT, uterine, or adnexal tenderness to palpation.  Urethra appears normal    Pelvic floor:  3/5      Wetprep was not performed today.        CHICHO Ford MD, FACOG

## 2024-11-19 LAB
HPV HR 12 DNA CVX QL NAA+PROBE: NEGATIVE
HPV16 DNA CVX QL NAA+PROBE: NEGATIVE
HPV18 DNA CVX QL NAA+PROBE: NEGATIVE

## 2024-11-22 LAB
LAB AP GYN PRIMARY INTERPRETATION: NORMAL
Lab: NORMAL
ZNT8 AB SERPL IA-ACNC: <15 U/ML

## 2024-11-25 LAB — ISLET CELL512 AB SER-ACNC: <7.5 U/ML

## 2024-11-26 LAB — INSULIN AB SER-ACNC: <5 UU/ML

## 2024-11-27 ENCOUNTER — TELEPHONE (OUTPATIENT)
Dept: OTHER | Facility: HOSPITAL | Age: 39
End: 2024-11-27

## 2024-11-27 NOTE — TELEPHONE ENCOUNTER
Attempted to reach out to [facility] to discuss labs and next steps. Left VM that will try reaching out again on Friday, 11/29.

## 2024-11-29 NOTE — TELEPHONE ENCOUNTER
Patient returning call - suggested I could have a nurse speak with her, she stated if the doctor could just call her back Monday morning that would be fine.    Please advise.

## 2024-12-02 ENCOUNTER — RESULTS FOLLOW-UP (OUTPATIENT)
Dept: LAB | Facility: CLINIC | Age: 39
End: 2024-12-02

## 2024-12-02 DIAGNOSIS — R73.03 PREDIABETES: Primary | ICD-10-CM

## 2024-12-02 NOTE — TELEPHONE ENCOUNTER
Spoke with Jodie over the  phone and discussed results.   She has positive GIULIANO-65 Ab and dysglycemia consistent with Stage 2 diabetes.   Discussed potentially delaying progression to Stage 3 with Tzield, however she does not qualify at this time.   Will order Anti-Islet Cell antibody and if positive - she will qualify for treatment.   Patient verbalized understanding and is in agreement with this plan.

## 2024-12-06 ENCOUNTER — APPOINTMENT (OUTPATIENT)
Dept: LAB | Facility: CLINIC | Age: 39
End: 2024-12-06
Payer: COMMERCIAL

## 2024-12-06 DIAGNOSIS — R73.03 PREDIABETES: ICD-10-CM

## 2024-12-06 PROCEDURE — 36415 COLL VENOUS BLD VENIPUNCTURE: CPT

## 2024-12-06 PROCEDURE — 86341 ISLET CELL ANTIBODY: CPT

## 2024-12-09 LAB — PANC ISLET CELL AB TITR SER: NEGATIVE {TITER}

## 2024-12-13 ENCOUNTER — RESULTS FOLLOW-UP (OUTPATIENT)
Dept: ADMINISTRATIVE | Facility: HOSPITAL | Age: 39
End: 2024-12-13

## 2024-12-13 DIAGNOSIS — R73.03 PREDIABETES: Primary | ICD-10-CM

## 2024-12-13 NOTE — TELEPHONE ENCOUNTER
Patient calling making us aware that his PCP (Dr. Avila) is asking to sign off on clearance to Hold plavix for 7 and Eliquis for 3-4 days prior to IR bx on 9/16/24. Patient is asking is clearance can be faxed to [phone]. ASAP. I let patient know I will be sending to IR department. Patient would like to be kept up to date on outcome.  Patient was at work and could not continue conversation.  I will attempt to make outreach again for assess barriers to care and go over DT.     Spoke with Jodie over the phone regarding her labs. Since only GAD65 AB is positive, she is not a candidate for Tzield. At this time we'll continue monitoring her labs for worsening glycemic status. Repeat A1C and BMP ordered to be done in 3 months. All questions were answered.

## 2025-01-11 ENCOUNTER — HOSPITAL ENCOUNTER (OUTPATIENT)
Dept: MAMMOGRAPHY | Facility: CLINIC | Age: 40
Discharge: HOME | End: 2025-01-11
Payer: COMMERCIAL

## 2025-01-11 VITALS — BODY MASS INDEX: 21.5 KG/M2 | WEIGHT: 137 LBS | HEIGHT: 67 IN

## 2025-01-11 DIAGNOSIS — Z12.31 SCREENING MAMMOGRAM FOR BREAST CANCER: ICD-10-CM

## 2025-01-11 PROCEDURE — 77067 SCR MAMMO BI INCL CAD: CPT

## 2025-01-11 PROCEDURE — 77063 BREAST TOMOSYNTHESIS BI: CPT

## 2025-02-06 ENCOUNTER — RESULTS FOLLOW-UP (OUTPATIENT)
Age: 40
End: 2025-02-06

## 2025-02-06 DIAGNOSIS — R92.30 DENSE BREAST TISSUE ON MAMMOGRAM, UNSPECIFIED TYPE: Primary | ICD-10-CM

## 2025-02-20 ENCOUNTER — TELEPHONE (OUTPATIENT)
Age: 40
End: 2025-02-20

## 2025-03-18 ENCOUNTER — LAB (OUTPATIENT)
Dept: LAB | Facility: CLINIC | Age: 40
End: 2025-03-18
Payer: COMMERCIAL

## 2025-03-18 DIAGNOSIS — R73.03 PREDIABETES: ICD-10-CM

## 2025-03-18 DIAGNOSIS — E61.1 IRON DEFICIENCY: ICD-10-CM

## 2025-03-18 DIAGNOSIS — E55.9 VITAMIN D DEFICIENCY: ICD-10-CM

## 2025-03-18 LAB
25(OH)D3 SERPL-MCNC: 46.8 NG/ML (ref 30–100)
GLUCOSE P FAST SERPL-MCNC: 161 MG/DL (ref 65–99)
IGA SERPL-MCNC: 117 MG/DL (ref 66–433)

## 2025-03-18 PROCEDURE — 82784 ASSAY IGA/IGD/IGG/IGM EACH: CPT

## 2025-03-18 PROCEDURE — 86341 ISLET CELL ANTIBODY: CPT

## 2025-03-18 PROCEDURE — 36415 COLL VENOUS BLD VENIPUNCTURE: CPT

## 2025-03-18 PROCEDURE — 86337 INSULIN ANTIBODIES: CPT

## 2025-03-18 PROCEDURE — 84681 ASSAY OF C-PEPTIDE: CPT

## 2025-03-18 PROCEDURE — 82306 VITAMIN D 25 HYDROXY: CPT

## 2025-03-18 PROCEDURE — 82985 ASSAY OF GLYCATED PROTEIN: CPT

## 2025-03-18 PROCEDURE — 86364 TISS TRNSGLTMNASE EA IG CLAS: CPT

## 2025-03-18 PROCEDURE — 82947 ASSAY GLUCOSE BLOOD QUANT: CPT

## 2025-03-18 PROCEDURE — 83519 RIA NONANTIBODY: CPT

## 2025-03-19 LAB
C PEPTIDE SERPL-MCNC: 2.2 NG/ML (ref 1.1–4.4)
FRUCTOSAMINE SERPL-SCNC: 341 UMOL/L (ref 0–285)

## 2025-03-20 LAB — PANC ISLET CELL AB TITR SER: NEGATIVE {TITER}

## 2025-03-23 LAB — TTG IGA SER IA-ACNC: <0.4 U/ML (ref ?–10)

## 2025-03-24 LAB — GAD65 AB SER-ACNC: 312.2 U/ML (ref 0–5)

## 2025-03-27 ENCOUNTER — OFFICE VISIT (OUTPATIENT)
Dept: ENDOCRINOLOGY | Facility: CLINIC | Age: 40
End: 2025-03-27
Payer: COMMERCIAL

## 2025-03-27 ENCOUNTER — TELEPHONE (OUTPATIENT)
Age: 40
End: 2025-03-27

## 2025-03-27 VITALS
BODY MASS INDEX: 21.66 KG/M2 | OXYGEN SATURATION: 98 % | HEIGHT: 67 IN | WEIGHT: 138 LBS | SYSTOLIC BLOOD PRESSURE: 122 MMHG | HEART RATE: 85 BPM | DIASTOLIC BLOOD PRESSURE: 68 MMHG

## 2025-03-27 DIAGNOSIS — Z86.32 HISTORY OF GESTATIONAL DIABETES: ICD-10-CM

## 2025-03-27 DIAGNOSIS — R73.03 PREDIABETES: Primary | ICD-10-CM

## 2025-03-27 DIAGNOSIS — E55.9 VITAMIN D DEFICIENCY: ICD-10-CM

## 2025-03-27 LAB — SL AMB POCT HEMOGLOBIN AIC: 6.4 (ref ?–6.5)

## 2025-03-27 PROCEDURE — 99214 OFFICE O/P EST MOD 30 MIN: CPT | Performed by: INTERNAL MEDICINE

## 2025-03-27 PROCEDURE — 83036 HEMOGLOBIN GLYCOSYLATED A1C: CPT | Performed by: INTERNAL MEDICINE

## 2025-03-27 NOTE — ASSESSMENT & PLAN NOTE
Will refer patient to MFM for preconception planning.   Orders:    Ambulatory Referral to Maternal Fetal Medicine; Future

## 2025-03-27 NOTE — PROGRESS NOTES
Name: Jdoie Nicole      : 1985      MRN: [id number]  Encounter Provider: Abel Llamas MD  Encounter Date: 3/27/2025   Encounter department: [facility]OLOGY [place]    No chief complaint on file.  :  Assessment & Plan  Prediabetes  Patient with prediabetes with most recent A1c of 6.4%, previously 6.3% in 2024.  Workup in November demonstrated elevated GIULIANO, but normal IA 2, insulin autoantibody and zinc transporter 8 antibody.  Unfortunately patient was not a candidate for TZIELD at that time.  Given plans for fertility patient will benefit from meeting with MFM for preconception planning.   Discussed with patient progression to overt diabetes mellitus and need for insulin therapy in the future.  Her BMI is 21.61 at today's visit, it is unlikely she will be a good candidate for insulin sensitizers.  Additionally discussed with patient increased risk of progression to diabetes during pregnancy and concern for development of DKA with resultant fetal loss.   Provided patient with referral to maternal-fetal medicine for preconception planning.  Additionally provided patient with ambulatory referral to diabetes education for MNT.  Autoimmune labs were drawn with most recent set of labs - await results. If patient noted to have 2 positive antibodies, we can consider Tzield to slow down progression to stage 3 diabetes mellitus.   Will get updated HbA1c, C peptide and BMP in 3 months.    Next scheduled follow-up-in 3 months.  Orders:    Ambulatory referral to Diabetic Education; Future    Ambulatory Referral to Maternal Fetal Medicine; Future    Hemoglobin A1C; Future    Comprehensive metabolic panel; Future    C-peptide; Future    Vitamin D deficiency  Managed by patient's primary care provider  She currently takes ergocalciferol 22678 untis daily  25-OH vitamin D 46.1   Orders:    POCT hemoglobin A1c    History of gestational diabetes  Will refer  patient to [facility] for preconception planning.   Orders:    Ambulatory Referral to Maternal Fetal Medicine; Future        History of Present Illness     Jodie Nicole is a 39 y.o. female with   a past medical history of gestational diabetes mellitus, congenital hydronephrosis who is seen today in consult for the management of hyperglycemia.  She reports being diagnosed with gestational diabetes in [id number] when pregnant with her daughter.  Reports resolution after birth, however denies having glucose tolerance testing ordered after delivery.   States that her A1c has been normal until March 2024 when it was noted to be 5.9%.  Most recent A1c on file in September 20 24-6.3%, at which poinst PCP recommended evaluation by endocrinology.  Jodie was last seen by endocrinology on 11/11/2024.  At the time we ordered additional workup including antibodies and she was noted to have positive GIULIANO antibodies.  Unfortunately she was not a candidate for TCL at the time.  She recently got updated labs which demonstrated a fructosamine of 341, which would equate an A1c of 7.4%.  States that over the past 2 weeks has increased consumption of sweets.  Additionally notes a 10 pound weight loss since initiation of sertraline.  Notes that anxiety has improved significantly after initiation of this medication.  HbA1c in office today-6.4%.  At this time she does not take anti-diabetes medications.  Does not own a glucometer and does not check her sugars at home.  Breakfast usually consist of coffee with cream, cup of cottage cheese with banana and pecan/almonds on Gurvinder toast.  For lunch she usually has egg whites with butter and cheese on Gurvinder toast, sometimes avocado.  Dinner usually is very consists of vegetables, protein, tacos, cheeseburgers, soup with barley, cheese.  She snacks at 3:30 PM-usually has yogurt or skyr with pecans or almonds, sometimes has a protein smoothie, guacamole and chips.  Previously was active,  however states she has been less active recently.  She tried exercising at home, patient   Jodie does take vitamin D 50,000 units weekly prescribed by her primary care provider as vitamin D levels in September were noted to be low.  States that she been taking this dose for about a year.   She continues to note occasional dizziness, stating that she used to pass out a lot as a child.  Denies increased thirst or urination, heat or cold intolerance, diarrhea or constipation, palpitations, tremors.  At today's visit Jodie expressed interest in fertility and is hopeful to have one more child, although she and her significant other have not started trying for a second.     Last Eye Exam: Not on file  Last Foot Exam: Not on file  There are no preventive care reminders to display for this patient.        Review of Systems   Constitutional:  Negative for appetite change, fatigue and unexpected weight change.   HENT:  Negative for congestion and trouble swallowing.    Eyes:  Negative for visual disturbance.   Respiratory:  Negative for cough and shortness of breath.    Cardiovascular:  Negative for chest pain, palpitations and leg swelling.   Gastrointestinal:  Negative for abdominal pain, constipation, diarrhea, nausea and vomiting.   Endocrine: Negative for polydipsia and polyuria.   Genitourinary:  Negative for frequency.   Musculoskeletal:  Negative for myalgias.   Skin:  Negative for rash.   Neurological:  Negative for dizziness, tremors, weakness and light-headedness.   Psychiatric/Behavioral:  Negative for sleep disturbance.    All other systems reviewed and are negative.   as per HPI          Medical History Reviewed by provider this encounter:     .  Past Medical History   Past Medical History:   Diagnosis Date    Hydronephrosis      Past Surgical History:   Procedure Laterality Date    BREAST BIOPSY Left 2014    never had a mammo    COLONOSCOPY      October 2013:  Anal fissure and hemorrhoids, otherwise normal  colonoscopy including biopsy negative for microscopic colitis    PYELOPLASTY Right     robotic     Family History   Problem Relation Age of Onset    No Known Problems Mother     Cancer Father     Kidney cancer Father     No Known Problems Sister     No Known Problems Sister     No Known Problems Daughter     No Known Problems Maternal Grandmother     No Known Problems Maternal Grandfather     Breast cancer Paternal Grandmother     No Known Problems Paternal Grandfather     No Known Problems Maternal Aunt     No Known Problems Paternal Aunt     Colon polyps Neg Hx     Colon cancer Neg Hx     Inflammatory bowel disease Neg Hx     Celiac disease Neg Hx       reports that she has never smoked. She has never used smokeless tobacco. She reports that she does not currently use alcohol. She reports that she does not use drugs.  Current Outpatient Medications   Medication Instructions    ergocalciferol (VITAMIN D2) 50,000 units TAKE 1 CAPSULE BY MOUTH ONE TIME PER WEEK    Ferrous Sulfate (IRON PO) 1 tablet, Daily    ipratropium (ATROVENT) 0.03 % nasal spray No dose, route, or frequency recorded.    Multiple Vitamin (multivitamin) tablet 1 tablet, Daily    Omega-3 Fatty Acids (FISH OIL PO) 1 tablet, Daily    sertraline (ZOLOFT) 75 mg     Allergies   Allergen Reactions    Ciprofloxacin Headache and Rash     Other reaction(s): headaches  Other reaction(s): headaches  Other reaction(s): headaches  Other reaction(s): headaches  Other reaction(s): headaches  Other reaction(s): headaches    Other reaction(s): HEADACHE   Other reaction(s): headaches  Other reaction(s): headaches        Current Outpatient Medications on File Prior to Visit   Medication Sig Dispense Refill    ergocalciferol (VITAMIN D2) 50,000 units TAKE 1 CAPSULE BY MOUTH ONE TIME PER WEEK      Ferrous Sulfate (IRON PO) Take 1 tablet by mouth in the morning      Omega-3 Fatty Acids (FISH OIL PO) Take 1 tablet by mouth in the morning      sertraline (ZOLOFT) 50 mg  "tablet 75 mg (Patient taking differently: 75 mg 87.5mg nightly)      ipratropium (ATROVENT) 0.03 % nasal spray       Multiple Vitamin (multivitamin) tablet Take 1 tablet by mouth daily (Patient not taking: Reported on 11/11/2024)       No current facility-administered medications on file prior to visit.      Social History     Tobacco Use    Smoking status: Never    Smokeless tobacco: Never    Tobacco comments:     Smoked once in her teens   Vaping Use    Vaping status: Never Used   Substance and Sexual Activity    Alcohol use: Not Currently    Drug use: Never    Sexual activity: Not Currently     Partners: Male        Medical History Reviewed by provider this encounter:     .    Objective   /68   Pulse 85   Ht 5' 7\" (1.702 m)   Wt 62.6 kg (138 lb)   SpO2 98%   BMI 21.61 kg/m²      Body mass index is 21.61 kg/m².  Wt Readings from Last 3 Encounters:   03/27/25 62.6 kg (138 lb)   01/11/25 62.1 kg (137 lb)   11/11/24 62.5 kg (137 lb 12.8 oz)     Physical Exam  Vitals and nursing note reviewed.   Constitutional:       General: She is not in acute distress.     Appearance: Normal appearance. She is not ill-appearing, toxic-appearing or diaphoretic.   HENT:      Head: Normocephalic and atraumatic.      Nose: Nose normal.      Mouth/Throat:      Mouth: Mucous membranes are moist.      Pharynx: Oropharynx is clear.   Eyes:      General: No scleral icterus.        Right eye: No discharge.         Left eye: No discharge.      Extraocular Movements: Extraocular movements intact.      Conjunctiva/sclera: Conjunctivae normal.   Cardiovascular:      Rate and Rhythm: Normal rate and regular rhythm.      Pulses: Normal pulses.      Heart sounds: Normal heart sounds. No murmur heard.  Pulmonary:      Effort: Pulmonary effort is normal. No respiratory distress.      Breath sounds: Normal breath sounds. No wheezing, rhonchi or rales.   Abdominal:      General: Abdomen is flat. Bowel sounds are normal. There is no " "distension.      Palpations: Abdomen is soft.      Tenderness: There is no abdominal tenderness. There is no guarding or rebound.   Musculoskeletal:         General: Normal range of motion.      Cervical back: Normal range of motion and neck supple.      Right lower leg: No edema.      Left lower leg: No edema.   Skin:     General: Skin is warm and dry.      Coloration: Skin is not jaundiced or pale.   Neurological:      Mental Status: She is alert and oriented to person, place, and time. Mental status is at baseline.   Psychiatric:         Mood and Affect: Mood normal.         Behavior: Behavior normal.         Thought Content: Thought content normal.         Judgment: Judgment normal.         Labs:   Lab Results   Component Value Date    HGBA1C 6.4 03/27/2025    HGBA1C 6.3 (H) 09/20/2024     Lab Results   Component Value Date    CREATININE 0.74 09/20/2024    BUN 14 09/20/2024    K 4.1 09/20/2024     09/20/2024    CO2 28 09/20/2024     eGFR   Date Value Ref Range Status   09/20/2024 102 ml/min/1.73sq m Final     No results found for: \"CHOL\", \"HDL\", \"LDL\", \"TRIG\", \"CHOLHDL\"  Lab Results   Component Value Date    ALT 23 09/20/2024    AST 22 09/20/2024    ALKPHOS 65 09/20/2024     No results found for: \"[id number]\"    Patient Instructions   An ambulatory referral to maternal fetal medicine for pre-conception counseling has been ordered for you. Please reach out to them at [phone] to schedule your appointment.   We have also ordered a referral to diabetes education - see them at your earliest convenience.   Get labs done prior to your next appointment  Return for follow up in 3 months    Discussed with the patient and all questioned fully answered. She will call me if any problems arise.      "

## 2025-03-27 NOTE — ASSESSMENT & PLAN NOTE
Patient with prediabetes with most recent A1c of 6.4%, previously 6.3% in September 2024.  Workup in November demonstrated elevated GIULIANO, but normal IA 2, insulin autoantibody and zinc transporter 8 antibody.  Unfortunately patient was not a candidate for TZIELD at that time.  Given plans for fertility patient will benefit from meeting with MFM for preconception planning.   Discussed with patient progression to overt diabetes mellitus and need for insulin therapy in the future.  Her BMI is 21.61 at today's visit, it is unlikely she will be a good candidate for insulin sensitizers.  Additionally discussed with patient increased risk of progression to diabetes during pregnancy and concern for development of DKA with resultant fetal loss.   Provided patient with referral to maternal-fetal medicine for preconception planning.  Additionally provided patient with ambulatory referral to diabetes education for MNT.  Autoimmune labs were drawn with most recent set of labs - await results. If patient noted to have 2 positive antibodies, we can consider Tzield to slow down progression to stage 3 diabetes mellitus.   Will get updated HbA1c, C peptide and BMP in 3 months.    Next scheduled follow-up-in 3 months.  Orders:    Ambulatory referral to Diabetic Education; Future    Ambulatory Referral to Maternal Fetal Medicine; Future    Hemoglobin A1C; Future    Comprehensive metabolic panel; Future    C-peptide; Future

## 2025-03-27 NOTE — PATIENT INSTRUCTIONS
An ambulatory referral to maternal fetal medicine for pre-conception counseling has been ordered for you. Please reach out to them at [phone] to schedule your appointment.   We have also ordered a referral to diabetes education - see them at your earliest convenience.   Get labs done prior to your next appointment  Links to exercise videos were provided below   Return for follow up in 3 months    Full Version - [email or web address]  Warmup -[email or web address]  Lower Body -[email or web address]  Upper Body - [email or web address]  Core - [email or web address]

## 2025-03-28 NOTE — TELEPHONE ENCOUNTER
[facility] Maternal Fetal Med Pod Clerical  Please schedule a virtual visit or face-to-face preconception visit with [facility] physician.  Thanks,  Coco

## 2025-03-30 LAB
INSULIN AB SER-ACNC: <5 UU/ML
ISLET CELL512 AB SER-ACNC: <7.5 U/ML

## 2025-04-02 LAB — ZNT8 AB SERPL IA-ACNC: <15 U/ML

## 2025-04-03 ENCOUNTER — RESULTS FOLLOW-UP (OUTPATIENT)
Dept: ENDOCRINOLOGY | Facility: CLINIC | Age: 40
End: 2025-04-03

## 2025-04-04 ENCOUNTER — TELEPHONE (OUTPATIENT)
Age: 40
End: 2025-04-04

## 2025-05-12 ENCOUNTER — OFFICE VISIT (OUTPATIENT)
Dept: ENDOCRINOLOGY | Facility: CLINIC | Age: 40
End: 2025-05-12
Payer: COMMERCIAL

## 2025-05-12 VITALS
SYSTOLIC BLOOD PRESSURE: 120 MMHG | WEIGHT: 142.2 LBS | HEIGHT: 67 IN | BODY MASS INDEX: 22.32 KG/M2 | DIASTOLIC BLOOD PRESSURE: 70 MMHG

## 2025-05-12 DIAGNOSIS — R73.03 PREDIABETES: Primary | ICD-10-CM

## 2025-05-12 DIAGNOSIS — Z86.32 HISTORY OF GESTATIONAL DIABETES: ICD-10-CM

## 2025-05-12 PROCEDURE — 99214 OFFICE O/P EST MOD 30 MIN: CPT | Performed by: INTERNAL MEDICINE

## 2025-05-12 RX ORDER — BETAMETHASONE VALERATE 1.2 MG/G
AEROSOL, FOAM TOPICAL
COMMUNITY
Start: 2025-04-11

## 2025-05-12 RX ORDER — DOXYCYCLINE 100 MG/1
CAPSULE ORAL
COMMUNITY
Start: 2025-05-07

## 2025-05-12 RX ORDER — FLUCONAZOLE 200 MG/1
TABLET ORAL
COMMUNITY
Start: 2025-05-07

## 2025-05-12 NOTE — PROGRESS NOTES
5/12/2025    Assessment & Plan      Diagnoses and all orders for this visit:    Prediabetes    History of gestational diabetes    Other orders  -     fluconazole (DIFLUCAN) 200 mg tablet; TAKE 1 BY MOUTH EVERY DAY FOR 5 DAYS  -     doxycycline hyclate (VIBRAMYCIN) 100 mg capsule; TAKE 1 TABLET BY MOUTH EVERY DAY X 3 WEEKS  -     betamethasone valerate (LUXIQ) 0.12 % foam; Massage into scalp an hour before showering        Assessment/Plan:  Prediabetes with positive jenelle antibodies: We will continue to monitor over time.  Agree with preconception counseling meeting.  She did purchase over-the-counter stelo meter and I discussed I am happy to review that in this regard when she starts wearing it.  Will arrange for follow-up in 6 months.      CC: Follow-up    History of Present Illness     HPI: Jodie Nicole is a 39 y.o. year old female who presents for a follow-up appointment.  Initially seen in our office in November 2020 for.  She has history of gestational diabetes that is diet treated during pregnancy in 20 18 through 2019.  She has a history of JENELLE antibody positive finding in lab evaluation.  She presents today for a follow-up visit overall feeling well.  She has had a preconception counseling visit with maternal-fetal medicine coming up.    Review of Systems   All other systems reviewed and are negative.      Historical Information   Past Medical History:   Diagnosis Date    Hydronephrosis      Past Surgical History:   Procedure Laterality Date    BREAST BIOPSY Left 2014    never had a mammo    COLONOSCOPY      October 2013:  Anal fissure and hemorrhoids, otherwise normal colonoscopy including biopsy negative for microscopic colitis    PYELOPLASTY Right     robotic     Social History   Social History     Substance and Sexual Activity   Alcohol Use Not Currently     Social History     Substance and Sexual Activity   Drug Use Never     Social History     Tobacco Use   Smoking Status Never   Smokeless  "Tobacco Never   Tobacco Comments    Smoked once in her teens     Family History:   Family History   Problem Relation Age of Onset    No Known Problems Mother     Cancer Father     Kidney cancer Father     No Known Problems Sister     No Known Problems Sister     No Known Problems Daughter     No Known Problems Maternal Grandmother     No Known Problems Maternal Grandfather     Breast cancer Paternal Grandmother     No Known Problems Paternal Grandfather     No Known Problems Maternal Aunt     No Known Problems Paternal Aunt     Colon polyps Neg Hx     Colon cancer Neg Hx     Inflammatory bowel disease Neg Hx     Celiac disease Neg Hx        Meds/Allergies   Current Outpatient Medications   Medication Sig Dispense Refill    betamethasone valerate (LUXIQ) 0.12 % foam Massage into scalp an hour before showering      doxycycline hyclate (VIBRAMYCIN) 100 mg capsule TAKE 1 TABLET BY MOUTH EVERY DAY X 3 WEEKS      ergocalciferol (VITAMIN D2) 50,000 units TAKE 1 CAPSULE BY MOUTH ONE TIME PER WEEK      Ferrous Sulfate (IRON PO) Take 1 tablet by mouth in the morning      fluconazole (DIFLUCAN) 200 mg tablet TAKE 1 BY MOUTH EVERY DAY FOR 5 DAYS      Omega-3 Fatty Acids (FISH OIL PO) Take 1 tablet by mouth in the morning      sertraline (ZOLOFT) 50 mg tablet 75 mg      ipratropium (ATROVENT) 0.03 % nasal spray       Multiple Vitamin (multivitamin) tablet Take 1 tablet by mouth daily (Patient not taking: Reported on 11/11/2024)       No current facility-administered medications for this visit.     Allergies   Allergen Reactions    Ciprofloxacin Headache and Rash     Other reaction(s): headaches  Other reaction(s): headaches  Other reaction(s): headaches  Other reaction(s): headaches  Other reaction(s): headaches  Other reaction(s): headaches    Other reaction(s): HEADACHE   Other reaction(s): headaches  Other reaction(s): headaches         Objective   Vitals: Blood pressure 120/70, height 5' 7\" (1.702 m), weight 64.5 kg (142 lb " 3.2 oz), not currently breastfeeding.  Invasive Devices       None                   Physical Exam  Constitutional:       General: She is not in acute distress.     Appearance: She is well-developed. She is not diaphoretic.   HENT:      Head: Normocephalic and atraumatic.   Eyes:      Conjunctiva/sclera: Conjunctivae normal.      Pupils: Pupils are equal, round, and reactive to light.   Neck:      Thyroid: No thyromegaly.   Cardiovascular:      Rate and Rhythm: Normal rate and regular rhythm.   Pulmonary:      Effort: Pulmonary effort is normal. No respiratory distress.      Breath sounds: Normal breath sounds.   Abdominal:      General: Bowel sounds are normal.      Palpations: Abdomen is soft.   Musculoskeletal:         General: Normal range of motion.      Cervical back: Normal range of motion and neck supple.   Skin:     General: Skin is warm and dry.      Findings: No rash.   Neurological:      Mental Status: She is alert and oriented to person, place, and time.      Motor: No abnormal muscle tone.   Psychiatric:         Behavior: Behavior normal.         The history was obtained from the review of the chart and from the patient.    Lab Results:      Component      Latest Ref Rng 3/18/2025 3/27/2025   VITAMIN D, 25-HYDROXY      30.0 - 100.0 ng/mL 46.8     GLUCOSE, FASTING      65 - 99 mg/dL 161 (H)     C-PEPTIDE      1.1 - 4.4 ng/mL 2.2     ZINC TRANSPORTER 8 (ZNT8) ANTIBODY      U/mL <15     FRUCTOSAMINE      0 - 285 umol/L 341 (H)     Glutaminc Acid Decarboxylase 65 Ab      0.0 - 5.0 U/mL 312.2 (H)     IA-2 AUTOANTIBODIES      U/mL <7.5     INSULIN AUTOANTIBODY      uU/mL <5.0     Hemoglobin A1C      <=6.5   6.4       Legend:  (H) High    Future Appointments   Date Time Provider Department Center   2025  1:00 PM Whitney Szymanski MD [facility]   2025 12:30 PM CHICHO Ford MD [facility]       Portions of the record may have been created with voice recognition  "software. Occasional wrong word or \"sound a like\" substitutions may have occurred due to the inherent limitations of voice recognition software. Read the chart carefully and recognize, using context, where substitutions have occurred.    "

## 2025-06-17 ENCOUNTER — TELEMEDICINE (OUTPATIENT)
Dept: PERINATAL CARE | Facility: CLINIC | Age: 40
End: 2025-06-17
Payer: COMMERCIAL

## 2025-06-17 DIAGNOSIS — Z31.69 ENCOUNTER FOR PRECONCEPTION CONSULTATION: Primary | ICD-10-CM

## 2025-06-17 DIAGNOSIS — Z86.32 HISTORY OF GESTATIONAL DIABETES: ICD-10-CM

## 2025-06-17 DIAGNOSIS — R76.8 POSITIVE GAD ANTIBODY: ICD-10-CM

## 2025-06-17 DIAGNOSIS — R73.03 PREDIABETES: ICD-10-CM

## 2025-06-17 PROCEDURE — 99244 OFF/OP CNSLTJ NEW/EST MOD 40: CPT | Performed by: OBSTETRICS & GYNECOLOGY

## 2025-06-17 NOTE — ASSESSMENT & PLAN NOTE
Advise skipping 1h test and 3h test and refer straight to diabetes management (Diabetes in Pregnancy program) for glucose monitoring and dietary modification.  Likely will be a GDMA2 since she will be advised to eat carbohydrates in pregnancy (unlike last pregnancy when she ate little to no carbohydrates).  Discussed that gestational diabetes is one of the most common medical complications of pregnancy, affecting 5-6% of [place] women. Women with GDM have an increased risk of preeclampsia and  delivery and of diabetes later in life. The offspring of women with GDM are at increased risk of macrosomia,  hypoglycemia, hyperbilirubinemia, shoulder dystocia, and birth trauma.   GDM management begins with dietary modifications, exercise, and glucose monitoring.  Basic targets include reducing added sugar (<25g/day), reducing processed foods, and adding postprandial walks.    The goal of medical nutrition therapy (MNT) in women with GDM is to achieve euglycemia, prevent ketosis, achieve adequate weight gain, and contribute to appropriate fetal growth and development. Insulin, which does not cross the placenta, can achieve tight metabolic control and traditionally has been added to nutrition therapy if fastings are elevated. Oral antidiabetic medication, specifically metformin, is also used frequently.  Fetal growth is advised every 4 weeks.  For GDMA2, twice weekly antepartum fetal surveillance is advised starting at 32 weeks, and delivery by EDC is advised.    All patients with GDM should have close follow-up with our multidisciplinary Diabetes in Pregnancy Program as well as postpartum screening for overt diabetes mellitus via 75g test.

## 2025-06-17 NOTE — LETTER
2025     CHICHO Ford MD  [address] [address]    Patient: Jodie Nicole   YOB: 1985   Date of Visit: 2025       Dear MD Sam Gonzalez DO:    Thank you for referring Jodie Nicole to me for evaluation. Below are my notes for this consultation.    If you have questions, please do not hesitate to call me. I look forward to following your patient along with you.         Sincerely,        Whitney Szymanski MD        CC: DO Whitney Cody MD  2025  1:41 PM  Sign when Signing Visit  PRECONCEPTION CONSULTATION: MATERNAL-FETAL MEDICINE     Virtual Regular Visit    Verification of patient location: Jodie Nicole is located in the [facility] state in which I hold an active license [place].    The patient was identified by name and date of birth.   She was informed that this is a telemedicine visit and that the visit is being conducted through the Epic Embedded platform. She agrees to proceed.    My office door was closed.   No one else was in the room.    She acknowledged consent and understanding of privacy and security of the platform, agrees to participate, and understands they can discontinue the visit at any time.    Patient is aware this is a billable service.     Assessment and Plan: Ms. Nicole is a 39 y.o. year-old  here for preconception counseling.  By issue:    Problem List Items Addressed This Visit          Obstetrics/Gynecology    History of gestational diabetes       Other    Prediabetes    Advise skipping 1h test and 3h test and refer straight to diabetes management (Diabetes in Pregnancy program) for glucose monitoring and dietary modification.  Likely will be a GDMA2 since she will be advised to eat carbohydrates in pregnancy (unlike last pregnancy when she ate little to no carbohydrates).  Discussed that gestational diabetes is one of the most  common medical complications of pregnancy, affecting 5-6% of [place] women. Women with GDM have an increased risk of preeclampsia and  delivery and of diabetes later in life. The offspring of women with GDM are at increased risk of macrosomia,  hypoglycemia, hyperbilirubinemia, shoulder dystocia, and birth trauma.   GDM management begins with dietary modifications, exercise, and glucose monitoring.  Basic targets include reducing added sugar (<25g/day), reducing processed foods, and adding postprandial walks.    The goal of medical nutrition therapy (MNT) in women with GDM is to achieve euglycemia, prevent ketosis, achieve adequate weight gain, and contribute to appropriate fetal growth and development. Insulin, which does not cross the placenta, can achieve tight metabolic control and traditionally has been added to nutrition therapy if fastings are elevated. Oral antidiabetic medication, specifically metformin, is also used frequently.  Fetal growth is advised every 4 weeks.  For GDMA2, twice weekly antepartum fetal surveillance is advised starting at 32 weeks, and delivery by EDC is advised.    All patients with GDM should have close follow-up with our multidisciplinary Diabetes in Pregnancy Program as well as postpartum screening for overt diabetes mellitus via 75g test.         Positive GIULIANO antibody    Islet (beta cell) autoantibodies 3/18/25.  Patients with >= one diabetes autoantibody are at risk of WINNIE (latent autoimmune diabetes in adults) but may have prolonged preservation of insulin secretion and a more gradual onset of clinical diabetes than typical type 1 diabetes in children and adolescents.   Antibody presence may indicate greater likelihood of needing insulin in presence of type 2 diabetes.  Association with stiff-person syndrome.  Scant pregnancy-specific data by PMID:  79226851: potential benefit of adding metformin; similar insulin requirements.  26536344: no increased risk of  LGA.  50936277: associations with type 1, 2, and IGT.  28663388: GIULIANO+ve women had higher glucose levels and impaired insulin secretion adjusted for insulin sensitivity (DI) compared with GIULIANO-ve women. The combination of OGTT and GIULIANO autoantibodies post-partum identify women with impaired ß-cell function. These women should be followed with special focus on development of Type 1 diabetes.  [id number] and [id number] and 95216905: predictive of type 1 diabetes development.         Encounter for preconception consultation - Primary    Advised folate supplementation/prenatal vitamin to prevent NTD.  Discussed and offered carrier screening; she will get results.  Varicella immunity: had in childhood.   Nutrition: advise healthy sources of protein, calcium and iron; advise minimizing added sugar in diet (<25g/day).  Exercise: advise 150 minutes/week regular aerobic exercise.  Walks, jogs, stretching/jogging.  Gets likely 120 minutes per week, asked to increase.  Discussed routine schedule of establishing early OB care, nuchal translucency ultrasound with aneuploidy screening if desired, detailed anatomic survey, and any additional followup.  Reviewed aneuploidy risks for current age +1, and when risks cross 1% threshold which occurs around age 40.  Reviewed that aneuploidy risks increase with age and that age is a spectrum, rather than a strict cutoff, though AMA classically described as age 35.  Medication review: no teratogens.  Aspirin prophylaxis: not indicated.  We will see her back once pregnant or sooner PRN.                Referring physician:   Abel Llamas Md  [address] [facility]  [address]  [place] [address]    Reason for consultation: contemplating pregnancy.    Dear Dr. Ford,    Thank you for referring patient Jodie Nicole for preconception Maternal-Fetal Medicine consultation.  Her history is as follows:    Past Medical History[1]    Past Surgical History[2]    OB History    Para Term   AB Living   1 1 1 0 0 1   SAB IAB Ectopic Multiple Live Births   0 0 0 0 1      # Outcome Date GA Lbr Oskar/2nd Weight Sex Type Anes PTL Lv   1 Term 19 39w0d  3799 g (8 lb 6 oz) F Vag-Spont         Birth Comments: GDMA1; ate very strictly, very few carbs (Gurvinder bread), walked after every meal, sugars 180s-200s (gravy, cauli pizza); delayed PPH (1mo) treated medically. 1h test was 220, skipped 3h.       Gynecologic history: regular menses q28-30d.  Abstinence currently is contraceptive method.    Social History[3]    Family History[4]    Family history per our office screening tool includes possible Ashkenazi ancestry (?14% on carrier screening during Yue's pregnancy; Willy is not a carrier for the conditions she is positive for - she will get records;  but is negative for birth defects, blood clot in legs or lungs, diabetes, early-onset breast ovarian or colon cancer; Down syndrome or other chromosome problem, genetic condition or carrier state for cystic fibrosis, sickle cell, thalassemia, Brady-Sachs, or spinal muscular atrophy; hemophilia or von Willebrand's disease; preeclampsia or hypertension, or opiate use disorder/addiction or overdose.    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  Spouse/Partner Name: Willy; Age 42; occupation: [employer] consulting/asbestos litigation; health status: good (very active, runs 4m daily 5-6x/wk).    Physical Exam  Constitutional:       General: She is not in acute distress.     Appearance: Normal appearance. She is not ill-appearing or toxic-appearing.   HENT:      Head: Normocephalic and atraumatic.       Nose: No congestion.     Eyes:      Extraocular Movements: Extraocular movements intact.     Pulmonary:      Effort: Pulmonary effort is normal. No respiratory distress.     Musculoskeletal:      Cervical back: Normal range of motion. No rigidity.     Skin:     Coloration: Skin is not jaundiced.     Neurological:      General: No focal deficit present.      Mental Status: She is alert and oriented to person, place, and time.      Coordination: Coordination normal.     Psychiatric:         Mood and Affect: Mood normal.         Behavior: Behavior normal.         Thought Content: Thought content normal.         Judgment: Judgment normal.           -------------------------    The time spent on this new patient on the encounter date included previsit service time of  10 minutes reviewing records and precharting, face-to-face (via virtual platform) service time of  38 minutes counseling regarding results and coordinating care, and post-service time of  7 minutes charting, totalling 55 minutes.    At the conclusion of today's encounter, all questions were answered to her satisfaction.  Thank you very much for this kind referral and please do not hesitate to contact me with any further questions or concerns.    Sincerely,    Whitney Szymanski MD  Attending Physician, Maternal-Fetal Medicine  [facility]         [1]   Past Medical History:  Diagnosis Date   • Hydronephrosis     dx'd 11/2020 after abd pain; pyelophasty at [facility]; supraumbilical (w hernia resultant) and RLQ LSC insicions (x4)   • Prediabetes 03/27/2025    steady at 6.3%; sees endocrine.  Will start Stelo soon, has never done a CGM.   [2]   Past Surgical History:  Procedure Laterality Date   • BREAST BIOPSY Left 2014    never had a mammo   • COLONOSCOPY      October 2013:  Anal fissure and hemorrhoids, otherwise normal colonoscopy including biopsy negative for microscopic colitis   • PYELOPLASTY Right     robotic   [3]   Social  History  Tobacco Use   • Smoking status: Never   • Smokeless tobacco: Never   • Tobacco comments:     Smoked once in her teens   Vaping Use   • Vaping status: Never Used   Substance Use Topics   • Alcohol use: Not Currently   • Drug use: Never   [4]   Family History  Problem Relation Name Age of Onset   • No Known Problems Mother     • Cancer Father     • Kidney cancer Father     • No Known Problems Sister     • No Known Problems Sister     • No Known Problems Daughter     • No Known Problems Maternal Grandmother     • No Known Problems Maternal Grandfather     • Breast cancer Paternal Grandmother     • No Known Problems Paternal Grandfather     • No Known Problems Maternal Aunt     • No Known Problems Paternal Aunt     • Colon polyps Neg Hx     • Colon cancer Neg Hx     • Inflammatory bowel disease Neg Hx     • Celiac disease Neg Hx     [5]   Current Outpatient Medications:   •  ergocalciferol (VITAMIN D2) 50,000 units, TAKE 1 CAPSULE BY MOUTH ONE TIME PER WEEK, Disp: , Rfl:   •  Ferrous Sulfate (IRON PO), Take 1 tablet by mouth in the morning, Disp: , Rfl:   •  sertraline (ZOLOFT) 50 mg tablet, 75 mg, Disp: , Rfl:   •  Omega-3 Fatty Acids (FISH OIL PO), Take 1 tablet by mouth in the morning, Disp: , Rfl:        [1]  Past Medical History:  Diagnosis Date   • Hydronephrosis     dx'd 11/2020 after abd pain; pyelophasty at [facility]; supraumbilical (w hernia resultant) and RLQ LSC insicions (x4)   • Prediabetes 03/27/2025    steady at 6.3%; sees endocrine.  Will start Stelo soon, has never done a CGM.   [2]  Past Surgical History:  Procedure Laterality Date   • BREAST BIOPSY Left 2014    never had a mammo   • COLONOSCOPY      October 2013:  Anal fissure and hemorrhoids, otherwise normal colonoscopy including biopsy negative for microscopic colitis   • PYELOPLASTY Right     robotic   [3]  Social History  Tobacco Use   • Smoking status: Never   • Smokeless tobacco: Never   • Tobacco comments:     Smoked once in her teens    Vaping Use   • Vaping status: Never Used   Substance Use Topics   • Alcohol use: Not Currently   • Drug use: Never   [4]  Family History  Problem Relation Name Age of Onset   • No Known Problems Mother     • Cancer Father     • Kidney cancer Father     • No Known Problems Sister     • No Known Problems Sister     • No Known Problems Daughter     • No Known Problems Maternal Grandmother     • No Known Problems Maternal Grandfather     • Breast cancer Paternal Grandmother     • No Known Problems Paternal Grandfather     • No Known Problems Maternal Aunt     • No Known Problems Paternal Aunt     • Colon polyps Neg Hx     • Colon cancer Neg Hx     • Inflammatory bowel disease Neg Hx     • Celiac disease Neg Hx     [5]    Current Outpatient Medications:   •  ergocalciferol (VITAMIN D2) 50,000 units, TAKE 1 CAPSULE BY MOUTH ONE TIME PER WEEK, Disp: , Rfl:   •  Ferrous Sulfate (IRON PO), Take 1 tablet by mouth in the morning, Disp: , Rfl:   •  sertraline (ZOLOFT) 50 mg tablet, 75 mg, Disp: , Rfl:   •  Omega-3 Fatty Acids (FISH OIL PO), Take 1 tablet by mouth in the morning, Disp: , Rfl:

## 2025-06-17 NOTE — PROGRESS NOTES
PRECONCEPTION CONSULTATION: MATERNAL-FETAL MEDICINE     Virtual Regular Visit    Verification of patient location: Jodie Nicole is located in the following state in which I hold an active license [place].    The patient was identified by name and date of birth.   She was informed that this is a telemedicine visit and that the visit is being conducted through the Epic Embedded platform. She agrees to proceed.    My office door was closed.   No one else was in the room.    She acknowledged consent and understanding of privacy and security of the platform, agrees to participate, and understands they can discontinue the visit at any time.    Patient is aware this is a billable service.     Assessment and Plan: Ms. Nicole is a 39 y.o. year-old  here for preconception counseling.  By issue:    Problem List Items Addressed This Visit          Obstetrics/Gynecology    History of gestational diabetes       Other    Prediabetes    Advise skipping 1h test and 3h test and refer straight to diabetes management (Diabetes in Pregnancy program) for glucose monitoring and dietary modification.  Likely will be a GDMA2 since she will be advised to eat carbohydrates in pregnancy (unlike last pregnancy when she ate little to no carbohydrates).  Discussed that gestational diabetes is one of the most common medical complications of pregnancy, affecting 5-6% of [place] women. Women with GDM have an increased risk of preeclampsia and  delivery and of diabetes later in life. The offspring of women with GDM are at increased risk of macrosomia,  hypoglycemia, hyperbilirubinemia, shoulder dystocia, and birth trauma.   GDM management begins with dietary modifications, exercise, and glucose monitoring.  Basic targets include reducing added sugar (<25g/day), reducing processed foods, and adding postprandial walks.    The goal of medical nutrition therapy (MNT) in women with GDM is to achieve euglycemia, prevent  ketosis, achieve adequate weight gain, and contribute to appropriate fetal growth and development. Insulin, which does not cross the placenta, can achieve tight metabolic control and traditionally has been added to nutrition therapy if fastings are elevated. Oral antidiabetic medication, specifically metformin, is also used frequently.  Fetal growth is advised every 4 weeks.  For GDMA2, twice weekly antepartum fetal surveillance is advised starting at 32 weeks, and delivery by EDC is advised.    All patients with GDM should have close follow-up with our multidisciplinary Diabetes in Pregnancy Program as well as postpartum screening for overt diabetes mellitus via 75g test.         Positive GIULIANO antibody    Islet (beta cell) autoantibodies 3/18/25.  Patients with >= one diabetes autoantibody are at risk of WINNIE (latent autoimmune diabetes in adults) but may have prolonged preservation of insulin secretion and a more gradual onset of clinical diabetes than typical type 1 diabetes in children and adolescents.   Antibody presence may indicate greater likelihood of needing insulin in presence of type 2 diabetes.  Association with stiff-person syndrome.  Scant pregnancy-specific data by PMID:  28874450: potential benefit of adding metformin; similar insulin requirements.  12490315: no increased risk of LGA.  13867452: associations with type 1, 2, and IGT.  98386890: GIULIANO+ve women had higher glucose levels and impaired insulin secretion adjusted for insulin sensitivity (DI) compared with GIULIANO-ve women. The combination of OGTT and GIULIANO autoantibodies post-partum identify women with impaired ?-cell function. These women should be followed with special focus on development of Type 1 diabetes.  [id number] and [id number] and 34026905: predictive of type 1 diabetes development.         Encounter for preconception consultation - Primary    Advised folate supplementation/prenatal vitamin to prevent NTD.  Discussed and offered carrier  screening; she will get results.  Varicella immunity: had in childhood.   Nutrition: advise healthy sources of protein, calcium and iron; advise minimizing added sugar in diet (<25g/day).  Exercise: advise 150 minutes/week regular aerobic exercise.  Walks, jogs, stretching/jogging.  Gets likely 120 minutes per week, asked to increase.  Discussed routine schedule of establishing early OB care, nuchal translucency ultrasound with aneuploidy screening if desired, detailed anatomic survey, and any additional followup.  Reviewed aneuploidy risks for current age +1, and when risks cross 1% threshold which occurs around age 40.  Reviewed that aneuploidy risks increase with age and that age is a spectrum, rather than a strict cutoff, though AMA classically described as age 35.  Medication review: no teratogens.  Aspirin prophylaxis: not indicated.  We will see her back once pregnant or sooner PRN.                Referring physician:   Abel Llamas Md  [address]  [address] [place] [address]    Reason for consultation: contemplating pregnancy.    Dear Dr. Ford,    Thank you for referring patient Jodie Nicole for preconception Maternal-Fetal Medicine consultation.  Her history is as follows:    Past Medical History[1]    Past Surgical History[2]    OB History    Para Term  AB Living   1 1 1 0 0 1   SAB IAB Ectopic Multiple Live Births   0 0 0 0 1      # Outcome Date GA Lbr Oskar/2nd Weight Sex Type Anes PTL Lv   1 Term 19 39w0d  3799 g (8 lb 6 oz) F Vag-Spont         Birth Comments: GDMA1; ate very strictly, very few carbs (Gurvinder bread), walked after every meal, sugars 180s-200s (gravy, cauli pizza); delayed PPH (1mo) treated medically. 1h test was 220, skipped 3h.       Gynecologic history: regular menses q28-30d.  Abstinence currently is contraceptive method.    Social History[3]    Family History[4]    Family history per our office screening tool includes possible  Ashkenazi ancestry (?14% on carrier screening during Yue's pregnancy; Willy is not a carrier for the conditions she is positive for - she will get records;  but is negative for birth defects, blood clot in legs or lungs, diabetes, early-onset breast ovarian or colon cancer; Down syndrome or other chromosome problem, genetic condition or carrier state for cystic fibrosis, sickle cell, thalassemia, Brady-Sachs, or spinal muscular atrophy; hemophilia or von Willebrand's disease; preeclampsia or hypertension, or opiate use disorder/addiction or overdose.    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  Spouse/Partner Name: Willy; Age 42; occupation: [employer] consulting/asbestos litigation; health status: good (very active, runs 4m daily 5-6x/wk).    Physical Exam  Constitutional:       General: She is not in acute distress.     Appearance: Normal appearance. She is not ill-appearing or toxic-appearing.   HENT:      Head: Normocephalic and atraumatic.      Nose: No congestion.     Eyes:      Extraocular Movements: Extraocular movements intact.     Pulmonary:      Effort: Pulmonary effort is normal. No respiratory distress.     Musculoskeletal:      Cervical back: Normal range of motion. No rigidity.     Skin:     Coloration: Skin is not jaundiced.     Neurological:      General: No focal deficit present.      Mental Status: She is alert and oriented to person, place, and time.      Coordination: Coordination normal.     Psychiatric:         Mood and Affect: Mood normal.         Behavior: Behavior normal.         Thought Content: Thought content normal.          Judgment: Judgment normal.           -------------------------    The time spent on this new patient on the encounter date included previsit service time of  10 minutes reviewing records and precharting, face-to-face (via virtual platform) service time of  38 minutes counseling regarding results and coordinating care, and post-service time of  7 minutes charting, totalling 55 minutes.    At the conclusion of today's encounter, all questions were answered to her satisfaction.  Thank you very much for this kind referral and please do not hesitate to contact me with any further questions or concerns.    Sincerely,    Whitney Szymanski MD  Attending Physician, Maternal-Fetal Medicine  [facility]         [1]   Past Medical History:  Diagnosis Date    Hydronephrosis     dx'd 11/2020 after abd pain; pyelophasty at [facility]; supraumbilical (w hernia resultant) and RLQ LSC insicions (x4)    Prediabetes 03/27/2025    steady at 6.3%; sees endocrine.  Will start Stelo soon, has never done a CGM.   [2]   Past Surgical History:  Procedure Laterality Date    BREAST BIOPSY Left 2014    never had a mammo    COLONOSCOPY      October 2013:  Anal fissure and hemorrhoids, otherwise normal colonoscopy including biopsy negative for microscopic colitis    PYELOPLASTY Right     robotic   [3]   Social History  Tobacco Use    Smoking status: Never    Smokeless tobacco: Never    Tobacco comments:     Smoked once in her teens   Vaping Use    Vaping status: Never Used   Substance Use Topics    Alcohol use: Not Currently    Drug use: Never   [4]   Family History  Problem Relation Name Age of Onset    No Known Problems Mother      Cancer Father      Kidney cancer Father      No Known Problems Sister      No Known Problems Sister      No Known Problems Daughter      No Known Problems Maternal Grandmother      No Known Problems Maternal Grandfather      Breast cancer Paternal Grandmother      No Known Problems Paternal  Grandfather      No Known Problems Maternal Aunt      No Known Problems Paternal Aunt      Colon polyps Neg Hx      Colon cancer Neg Hx      Inflammatory bowel disease Neg Hx      Celiac disease Neg Hx     [5]   Current Outpatient Medications:     ergocalciferol (VITAMIN D2) 50,000 units, TAKE 1 CAPSULE BY MOUTH ONE TIME PER WEEK, Disp: , Rfl:     Ferrous Sulfate (IRON PO), Take 1 tablet by mouth in the morning, Disp: , Rfl:     sertraline (ZOLOFT) 50 mg tablet, 75 mg, Disp: , Rfl:     Omega-3 Fatty Acids (FISH OIL PO), Take 1 tablet by mouth in the morning, Disp: , Rfl:

## 2025-06-17 NOTE — ASSESSMENT & PLAN NOTE
Islet (beta cell) autoantibodies 3/18/25.  Patients with >= one diabetes autoantibody are at risk of WINNIE (latent autoimmune diabetes in adults) but may have prolonged preservation of insulin secretion and a more gradual onset of clinical diabetes than typical type 1 diabetes in children and adolescents.   Antibody presence may indicate greater likelihood of needing insulin in presence of type 2 diabetes.  Association with stiff-person syndrome.  Scant pregnancy-specific data by PMID:  [id number]: potential benefit of adding metformin; similar insulin requirements.  [id number]: no increased risk of LGA.  [id number]: associations with type 1, 2, and IGT.  [id number]: GIULIANO+ve women had higher glucose levels and impaired insulin secretion adjusted for insulin sensitivity (DI) compared with GIULIANO-ve women. The combination of OGTT and GIULIANO autoantibodies post-partum identify women with impaired ?-cell function. These women should be followed with special focus on development of Type 1 diabetes.  [id number] and [id number] and [id number]: predictive of type 1 diabetes development.

## 2025-06-17 NOTE — ASSESSMENT & PLAN NOTE
Advised folate supplementation/prenatal vitamin to prevent NTD.  Discussed and offered carrier screening; she will get results.  Varicella immunity: had in childhood.   Nutrition: advise healthy sources of protein, calcium and iron; advise minimizing added sugar in diet (<25g/day).  Exercise: advise 150 minutes/week regular aerobic exercise.  Walks, jogs, stretching/jogging.  Gets likely 120 minutes per week, asked to increase.  Discussed routine schedule of establishing early OB care, nuchal translucency ultrasound with aneuploidy screening if desired, detailed anatomic survey, and any additional followup.  Reviewed aneuploidy risks for current age +1, and when risks cross 1% threshold which occurs around age 40.  Reviewed that aneuploidy risks increase with age and that age is a spectrum, rather than a strict cutoff, though AMA classically described as age 35.  Medication review: no teratogens.  Aspirin prophylaxis: not indicated.  We will see her back once pregnant or sooner PRN.